# Patient Record
Sex: MALE | Race: ASIAN | NOT HISPANIC OR LATINO
[De-identification: names, ages, dates, MRNs, and addresses within clinical notes are randomized per-mention and may not be internally consistent; named-entity substitution may affect disease eponyms.]

---

## 2020-01-28 ENCOUNTER — APPOINTMENT (OUTPATIENT)
Dept: GASTROENTEROLOGY | Facility: CLINIC | Age: 45
End: 2020-01-28
Payer: COMMERCIAL

## 2020-01-28 VITALS
HEIGHT: 64 IN | OXYGEN SATURATION: 96 % | HEART RATE: 67 BPM | SYSTOLIC BLOOD PRESSURE: 120 MMHG | DIASTOLIC BLOOD PRESSURE: 80 MMHG | BODY MASS INDEX: 25.1 KG/M2 | WEIGHT: 147 LBS

## 2020-01-28 DIAGNOSIS — Z83.79 FAMILY HISTORY OF OTHER DISEASES OF THE DIGESTIVE SYSTEM: ICD-10-CM

## 2020-01-28 PROCEDURE — 99204 OFFICE O/P NEW MOD 45 MIN: CPT

## 2020-01-28 NOTE — PHYSICAL EXAM
[General Appearance - Alert] : alert [General Appearance - In No Acute Distress] : in no acute distress [Sclera] : the sclera and conjunctiva were normal [PERRL With Normal Accommodation] : pupils were equal in size, round, and reactive to light [Outer Ear] : the ears and nose were normal in appearance [Extraocular Movements] : extraocular movements were intact [Oropharynx] : the oropharynx was normal [Neck Appearance] : the appearance of the neck was normal [Thyroid Diffuse Enlargement] : the thyroid was not enlarged [Neck Cervical Mass (___cm)] : no neck mass was observed [Jugular Venous Distention Increased] : there was no jugular-venous distention [Thyroid Nodule] : there were no palpable thyroid nodules [Heart Rate And Rhythm] : heart rate was normal and rhythm regular [Auscultation Breath Sounds / Voice Sounds] : lungs were clear to auscultation bilaterally [Heart Sounds Gallop] : no gallops [Heart Sounds] : normal S1 and S2 [Heart Sounds Pericardial Friction Rub] : no pericardial rub [Murmurs] : no murmurs [Abdomen Soft] : soft [Bowel Sounds] : normal bowel sounds [Abdomen Tenderness] : non-tender [Abdomen Mass (___ Cm)] : no abdominal mass palpated [No CVA Tenderness] : no ~M costovertebral angle tenderness [No Spinal Tenderness] : no spinal tenderness [Abnormal Walk] : normal gait [Nail Clubbing] : no clubbing  or cyanosis of the fingernails [Motor Tone] : muscle strength and tone were normal [Musculoskeletal - Swelling] : no joint swelling seen [Skin Color & Pigmentation] : normal skin color and pigmentation [Skin Turgor] : normal skin turgor [] : no rash [Deep Tendon Reflexes (DTR)] : deep tendon reflexes were 2+ and symmetric [Sensation] : the sensory exam was normal to light touch and pinprick [No Focal Deficits] : no focal deficits [Affect] : the affect was normal [Impaired Insight] : insight and judgment were intact [Oriented To Time, Place, And Person] : oriented to person, place, and time

## 2020-01-28 NOTE — ASSESSMENT
[FreeTextEntry1] : GERD and abdominal bloating and pain. There appears to be two process going on .\par \par \par \par Plan: CBC, comp met, TTG, IgA, TSH, lipid panel, LBT, GI PCR, O and P, Calprotectin, EGD.

## 2020-01-28 NOTE — HISTORY OF PRESENT ILLNESS
[de-identified] : 44 year old male with GERD who is now on Omeprazole 40 mg BID. He notes that trigger foods include red meat, beer and spicy food. In 2018 he underwent an EGD and colonoscopy. The EGD showed esophagitis and an esophageal ulcer. The colonoscopy showed hemorrhoids. He sleeps on a wedge and stops eating early but he will get GERD symptoms if he eats after 8 pm. The symptoms he has with the GERD are both regurgitation and chest burning. These symptoms seem to be well controlled on PPI.\par \par He comes to me because of new symptoms including gas, bloating, abdominal pain and increase frequency in BM 93 to 4 formed BM per day). The pain is lower abdominal pain and is constant. When he gets the pain he has nausea.

## 2020-02-12 ENCOUNTER — RESULT REVIEW (OUTPATIENT)
Age: 45
End: 2020-02-12

## 2020-02-12 ENCOUNTER — APPOINTMENT (OUTPATIENT)
Dept: GASTROENTEROLOGY | Facility: HOSPITAL | Age: 45
End: 2020-02-12

## 2020-02-12 ENCOUNTER — OUTPATIENT (OUTPATIENT)
Dept: OUTPATIENT SERVICES | Facility: HOSPITAL | Age: 45
LOS: 1 days | Discharge: HOME | End: 2020-02-12
Payer: COMMERCIAL

## 2020-02-12 VITALS
RESPIRATION RATE: 17 BRPM | OXYGEN SATURATION: 100 % | SYSTOLIC BLOOD PRESSURE: 122 MMHG | DIASTOLIC BLOOD PRESSURE: 70 MMHG | HEART RATE: 72 BPM

## 2020-02-12 VITALS
HEART RATE: 63 BPM | HEIGHT: 64 IN | DIASTOLIC BLOOD PRESSURE: 79 MMHG | SYSTOLIC BLOOD PRESSURE: 127 MMHG | TEMPERATURE: 98 F | RESPIRATION RATE: 12 BRPM | OXYGEN SATURATION: 100 % | WEIGHT: 145.95 LBS

## 2020-02-12 DIAGNOSIS — R10.9 UNSPECIFIED ABDOMINAL PAIN: ICD-10-CM

## 2020-02-12 PROCEDURE — 88305 TISSUE EXAM BY PATHOLOGIST: CPT | Mod: 26

## 2020-02-12 PROCEDURE — 88312 SPECIAL STAINS GROUP 1: CPT | Mod: 26

## 2020-02-12 PROCEDURE — 43239 EGD BIOPSY SINGLE/MULTIPLE: CPT | Mod: GC

## 2020-02-12 RX ORDER — SODIUM CHLORIDE 9 MG/ML
1000 INJECTION, SOLUTION INTRAVENOUS
Refills: 0 | Status: DISCONTINUED | OUTPATIENT
Start: 2020-02-12 | End: 2020-03-04

## 2020-02-12 RX ADMIN — SODIUM CHLORIDE 75 MILLILITER(S): 9 INJECTION, SOLUTION INTRAVENOUS at 12:12

## 2020-02-18 LAB — SURGICAL PATHOLOGY STUDY: SIGNIFICANT CHANGE UP

## 2020-02-24 ENCOUNTER — APPOINTMENT (OUTPATIENT)
Dept: GASTROENTEROLOGY | Facility: CLINIC | Age: 45
End: 2020-02-24
Payer: COMMERCIAL

## 2020-02-24 PROCEDURE — 91065 BREATH HYDROGEN/METHANE TEST: CPT | Mod: 53

## 2020-02-26 LAB
CALPROTECTIN FECAL: 17 UG/G
DEPRECATED O AND P PREP STL: NORMAL
GI PCR PANEL, STOOL: NORMAL

## 2020-03-23 ENCOUNTER — APPOINTMENT (OUTPATIENT)
Dept: GASTROENTEROLOGY | Facility: CLINIC | Age: 45
End: 2020-03-23

## 2020-10-23 ENCOUNTER — TRANSCRIPTION ENCOUNTER (OUTPATIENT)
Age: 45
End: 2020-10-23

## 2021-03-03 NOTE — ASU PATIENT PROFILE, ADULT - PATIENT REPRESENTATIVE: ( YOU CAN CHOOSE ANY PERSON THAT CAN ASSIST YOU WITH YOUR HEALTH CARE PREFERENCES, DOES NOT HAVE TO BE A SPOUSE, IMMEDIATE FAMILY OR SIGNIFICANT OTHER/PARTNER)
Cont to see Ms. Flynn for assistance with Sofi Corona    Rules:  · Count every calorie every day  · Limit sweets to one day per month  · Limit restaurants (including fast food and food from a convenience store) to one time every two weeks    Requirements:  · Make sure protein intake is at least 70 grams per day (Consider using a protein shake - Nectar, Pure Protein, Premier, Boost Max, Ensure Max, BeneProtein and [employer] (which is lactose-free) are milk-based options; Nectar, Premier Protein Clear, IsoPure Protein Drink, and Protein 2 O are water-based options; Quest (or Cosco, which is cheaper and is ordered on [address] [facility] [address]) and the [employer] 1 protein bars can also be used, but have less protein in them ); other drink options can be found in the protein powder report on the [address] [place] [address] website  · Make sure that fiber intake is at least 22 grams per day. Do this by either eating 22 tablespoons of the original, plain Fiber One cereal every day or 4 tablespoons of Benefiber every day. Work up to this amount slowly by starting with only one-fourth of the target amount and adding another one-fourth every one or two weeks  · Take one multivitamin every day    Target:  · Limit calorie intake to 1700 calories/day  · Walk at least 2 miles each day  · Avoid eating 2 hours within bedtime.  (This is part of the 8 hours of food-free periods)    Tips:  · Do not eat outside of the dining room or the kitchen  · Do not eat while watching TV, videos, working on the computer or using a smart phone  · Do not eat food out of a multi-serving bag or container    Other:  Take LT4 100 mcg daily + T3 5 mcg, one-half tab three times each day  Recheck TSH in six weeks Yes

## 2021-05-30 ENCOUNTER — TRANSCRIPTION ENCOUNTER (OUTPATIENT)
Age: 46
End: 2021-05-30

## 2021-09-17 ENCOUNTER — RESULT REVIEW (OUTPATIENT)
Age: 46
End: 2021-09-17

## 2021-09-17 ENCOUNTER — APPOINTMENT (OUTPATIENT)
Dept: RHEUMATOLOGY | Facility: CLINIC | Age: 46
End: 2021-09-17
Payer: COMMERCIAL

## 2021-09-17 VITALS
RESPIRATION RATE: 16 BRPM | SYSTOLIC BLOOD PRESSURE: 118 MMHG | DIASTOLIC BLOOD PRESSURE: 82 MMHG | HEART RATE: 69 BPM | TEMPERATURE: 97.8 F | HEIGHT: 64 IN | WEIGHT: 153 LBS | BODY MASS INDEX: 26.12 KG/M2 | OXYGEN SATURATION: 98 %

## 2021-09-17 DIAGNOSIS — M25.50 PAIN IN UNSPECIFIED JOINT: ICD-10-CM

## 2021-09-17 PROCEDURE — 99203 OFFICE O/P NEW LOW 30 MIN: CPT

## 2021-09-20 LAB
ALBUMIN SERPL ELPH-MCNC: 4.8 G/DL
ALP BLD-CCNC: 45 U/L
ALT SERPL-CCNC: 26 U/L
ANION GAP SERPL CALC-SCNC: 13 MMOL/L
AST SERPL-CCNC: 24 U/L
BASOPHILS # BLD AUTO: 0.02 K/UL
BASOPHILS NFR BLD AUTO: 0.2 %
BILIRUB SERPL-MCNC: 0.4 MG/DL
BUN SERPL-MCNC: 20 MG/DL
C TRACH RRNA SPEC QL NAA+PROBE: NOT DETECTED
CALCIUM SERPL-MCNC: 10.1 MG/DL
CCP AB SER IA-ACNC: <8 UNITS
CHLORIDE SERPL-SCNC: 102 MMOL/L
CK SERPL-CCNC: 88 U/L
CO2 SERPL-SCNC: 25 MMOL/L
CREAT SERPL-MCNC: 0.95 MG/DL
CRP SERPL-MCNC: <3 MG/L
EOSINOPHIL # BLD AUTO: 0.06 K/UL
EOSINOPHIL NFR BLD AUTO: 0.7 %
ERYTHROCYTE [SEDIMENTATION RATE] IN BLOOD BY WESTERGREN METHOD: 3 MM/HR
GLUCOSE SERPL-MCNC: 90 MG/DL
HCT VFR BLD CALC: 40.8 %
HGB BLD-MCNC: 13.1 G/DL
IMM GRANULOCYTES NFR BLD AUTO: 0.2 %
LYMPHOCYTES # BLD AUTO: 2.53 K/UL
LYMPHOCYTES NFR BLD AUTO: 30.9 %
MAN DIFF?: NORMAL
MCHC RBC-ENTMCNC: 28.5 PG
MCHC RBC-ENTMCNC: 32.1 GM/DL
MCV RBC AUTO: 88.9 FL
MONOCYTES # BLD AUTO: 0.56 K/UL
MONOCYTES NFR BLD AUTO: 6.8 %
N GONORRHOEA RRNA SPEC QL NAA+PROBE: NOT DETECTED
NEUTROPHILS # BLD AUTO: 4.99 K/UL
NEUTROPHILS NFR BLD AUTO: 61.2 %
PLATELET # BLD AUTO: 273 K/UL
POTASSIUM SERPL-SCNC: 4.1 MMOL/L
PROT SERPL-MCNC: 6.8 G/DL
RBC # BLD: 4.59 M/UL
RBC # FLD: 12.5 %
RF+CCP IGG SER-IMP: NEGATIVE
RHEUMATOID FACT SER QL: <10 IU/ML
SODIUM SERPL-SCNC: 140 MMOL/L
SOURCE AMPLIFICATION: NORMAL
URATE SERPL-MCNC: 3.7 MG/DL
WBC # FLD AUTO: 8.18 K/UL

## 2021-09-24 LAB — HLA-B27 RELATED AG QL: NEGATIVE

## 2021-09-29 ENCOUNTER — TRANSCRIPTION ENCOUNTER (OUTPATIENT)
Age: 46
End: 2021-09-29

## 2021-12-04 ENCOUNTER — EMERGENCY (EMERGENCY)
Facility: HOSPITAL | Age: 46
LOS: 1 days | Discharge: HOME | End: 2021-12-04
Attending: STUDENT IN AN ORGANIZED HEALTH CARE EDUCATION/TRAINING PROGRAM | Admitting: STUDENT IN AN ORGANIZED HEALTH CARE EDUCATION/TRAINING PROGRAM
Payer: COMMERCIAL

## 2021-12-04 VITALS
SYSTOLIC BLOOD PRESSURE: 144 MMHG | HEART RATE: 67 BPM | DIASTOLIC BLOOD PRESSURE: 80 MMHG | TEMPERATURE: 97 F | OXYGEN SATURATION: 98 % | RESPIRATION RATE: 16 BRPM

## 2021-12-04 VITALS
SYSTOLIC BLOOD PRESSURE: 125 MMHG | WEIGHT: 143.08 LBS | HEART RATE: 68 BPM | HEIGHT: 64 IN | OXYGEN SATURATION: 96 % | RESPIRATION RATE: 18 BRPM | TEMPERATURE: 97 F | DIASTOLIC BLOOD PRESSURE: 78 MMHG

## 2021-12-04 LAB
ALBUMIN SERPL ELPH-MCNC: 4 G/DL — SIGNIFICANT CHANGE UP (ref 3.3–5)
ALP SERPL-CCNC: 44 U/L — SIGNIFICANT CHANGE UP (ref 40–120)
ALT FLD-CCNC: 38 U/L — SIGNIFICANT CHANGE UP (ref 12–78)
ANION GAP SERPL CALC-SCNC: 5 MMOL/L — SIGNIFICANT CHANGE UP (ref 5–17)
AST SERPL-CCNC: 21 U/L — SIGNIFICANT CHANGE UP (ref 15–37)
BASOPHILS # BLD AUTO: 0.03 K/UL — SIGNIFICANT CHANGE UP (ref 0–0.2)
BASOPHILS NFR BLD AUTO: 0.3 % — SIGNIFICANT CHANGE UP (ref 0–2)
BILIRUB SERPL-MCNC: 0.4 MG/DL — SIGNIFICANT CHANGE UP (ref 0.2–1.2)
BUN SERPL-MCNC: 20 MG/DL — SIGNIFICANT CHANGE UP (ref 7–23)
CALCIUM SERPL-MCNC: 9.4 MG/DL — SIGNIFICANT CHANGE UP (ref 8.5–10.1)
CHLORIDE SERPL-SCNC: 107 MMOL/L — SIGNIFICANT CHANGE UP (ref 96–108)
CO2 SERPL-SCNC: 28 MMOL/L — SIGNIFICANT CHANGE UP (ref 22–31)
CREAT SERPL-MCNC: 0.85 MG/DL — SIGNIFICANT CHANGE UP (ref 0.5–1.3)
EOSINOPHIL # BLD AUTO: 0.1 K/UL — SIGNIFICANT CHANGE UP (ref 0–0.5)
EOSINOPHIL NFR BLD AUTO: 0.9 % — SIGNIFICANT CHANGE UP (ref 0–6)
GLUCOSE SERPL-MCNC: 87 MG/DL — SIGNIFICANT CHANGE UP (ref 70–99)
HCT VFR BLD CALC: 40.4 % — SIGNIFICANT CHANGE UP (ref 39–50)
HGB BLD-MCNC: 13.7 G/DL — SIGNIFICANT CHANGE UP (ref 13–17)
IMM GRANULOCYTES NFR BLD AUTO: 0.4 % — SIGNIFICANT CHANGE UP (ref 0–1.5)
LYMPHOCYTES # BLD AUTO: 2.29 K/UL — SIGNIFICANT CHANGE UP (ref 1–3.3)
LYMPHOCYTES # BLD AUTO: 20.9 % — SIGNIFICANT CHANGE UP (ref 13–44)
MCHC RBC-ENTMCNC: 28.8 PG — SIGNIFICANT CHANGE UP (ref 27–34)
MCHC RBC-ENTMCNC: 33.9 GM/DL — SIGNIFICANT CHANGE UP (ref 32–36)
MCV RBC AUTO: 85.1 FL — SIGNIFICANT CHANGE UP (ref 80–100)
MONOCYTES # BLD AUTO: 0.65 K/UL — SIGNIFICANT CHANGE UP (ref 0–0.9)
MONOCYTES NFR BLD AUTO: 5.9 % — SIGNIFICANT CHANGE UP (ref 2–14)
NEUTROPHILS # BLD AUTO: 7.86 K/UL — HIGH (ref 1.8–7.4)
NEUTROPHILS NFR BLD AUTO: 71.6 % — SIGNIFICANT CHANGE UP (ref 43–77)
NRBC # BLD: 0 /100 WBCS — SIGNIFICANT CHANGE UP (ref 0–0)
PLATELET # BLD AUTO: 295 K/UL — SIGNIFICANT CHANGE UP (ref 150–400)
POTASSIUM SERPL-MCNC: 4 MMOL/L — SIGNIFICANT CHANGE UP (ref 3.5–5.3)
POTASSIUM SERPL-SCNC: 4 MMOL/L — SIGNIFICANT CHANGE UP (ref 3.5–5.3)
PROT SERPL-MCNC: 7.7 G/DL — SIGNIFICANT CHANGE UP (ref 6–8.3)
RBC # BLD: 4.75 M/UL — SIGNIFICANT CHANGE UP (ref 4.2–5.8)
RBC # FLD: 12.5 % — SIGNIFICANT CHANGE UP (ref 10.3–14.5)
SODIUM SERPL-SCNC: 140 MMOL/L — SIGNIFICANT CHANGE UP (ref 135–145)
TROPONIN I, HIGH SENSITIVITY RESULT: 19.4 NG/L — SIGNIFICANT CHANGE UP
WBC # BLD: 10.97 K/UL — HIGH (ref 3.8–10.5)
WBC # FLD AUTO: 10.97 K/UL — HIGH (ref 3.8–10.5)

## 2021-12-04 PROCEDURE — 71045 X-RAY EXAM CHEST 1 VIEW: CPT | Mod: 26

## 2021-12-04 PROCEDURE — 36415 COLL VENOUS BLD VENIPUNCTURE: CPT

## 2021-12-04 PROCEDURE — 93010 ELECTROCARDIOGRAM REPORT: CPT

## 2021-12-04 PROCEDURE — 73562 X-RAY EXAM OF KNEE 3: CPT

## 2021-12-04 PROCEDURE — 82962 GLUCOSE BLOOD TEST: CPT

## 2021-12-04 PROCEDURE — 85025 COMPLETE CBC W/AUTO DIFF WBC: CPT

## 2021-12-04 PROCEDURE — G1004: CPT

## 2021-12-04 PROCEDURE — 72125 CT NECK SPINE W/O DYE: CPT | Mod: 26,MG

## 2021-12-04 PROCEDURE — 93005 ELECTROCARDIOGRAM TRACING: CPT

## 2021-12-04 PROCEDURE — 73562 X-RAY EXAM OF KNEE 3: CPT | Mod: 26,RT

## 2021-12-04 PROCEDURE — 80053 COMPREHEN METABOLIC PANEL: CPT

## 2021-12-04 PROCEDURE — 70450 CT HEAD/BRAIN W/O DYE: CPT | Mod: MG

## 2021-12-04 PROCEDURE — 84484 ASSAY OF TROPONIN QUANT: CPT

## 2021-12-04 PROCEDURE — 71045 X-RAY EXAM CHEST 1 VIEW: CPT

## 2021-12-04 PROCEDURE — 70450 CT HEAD/BRAIN W/O DYE: CPT | Mod: 26,MG

## 2021-12-04 PROCEDURE — 99284 EMERGENCY DEPT VISIT MOD MDM: CPT

## 2021-12-04 PROCEDURE — 99284 EMERGENCY DEPT VISIT MOD MDM: CPT | Mod: 25

## 2021-12-04 PROCEDURE — 72125 CT NECK SPINE W/O DYE: CPT | Mod: MG

## 2021-12-04 RX ORDER — ACETAMINOPHEN 500 MG
650 TABLET ORAL ONCE
Refills: 0 | Status: COMPLETED | OUTPATIENT
Start: 2021-12-04 | End: 2021-12-04

## 2021-12-04 RX ORDER — SODIUM CHLORIDE 9 MG/ML
1000 INJECTION INTRAMUSCULAR; INTRAVENOUS; SUBCUTANEOUS ONCE
Refills: 0 | Status: COMPLETED | OUTPATIENT
Start: 2021-12-04 | End: 2021-12-04

## 2021-12-04 RX ADMIN — Medication 650 MILLIGRAM(S): at 19:54

## 2021-12-04 RX ADMIN — SODIUM CHLORIDE 1000 MILLILITER(S): 9 INJECTION INTRAMUSCULAR; INTRAVENOUS; SUBCUTANEOUS at 19:54

## 2021-12-04 NOTE — ED PROVIDER NOTE - OBJECTIVE STATEMENT
47 y/o M PMHx HLD, GERD c/o syncopal episode. Pt finished urinating, felt his heart racing, woke up in the bathtub. Called his wife 3:46PM prior to going to bathroom and again 3:52PM after he passed out. He suspects LOC for <1 minute. Was pushing to get the urine out prior to LOC. Denies prior syncopal episodes. Has had cardiology evaluations in the past but reports they have been negative. At this time pt with R sided HA, neck pain. Reports recently has had cough. Denies fever, CP, SOB. Recently had episode of lightheadedness when stood up in ED.    PMD: Yazmin Moreira  Cardio: >4 years ago, unsure of name

## 2021-12-04 NOTE — ED PROVIDER NOTE - PROGRESS NOTE DETAILS
Reevaluated patient at bedside.  Patient feeling well. Discussed the results of all diagnostic testing in ED. Vasovagal syncope. Stay hydrated. Avoid standing up too quickly. Avoid bearing down while urinating. Tylenol prn pain. F/u doctor. An opportunity to ask questions was provided.  Discussed the importance of prompt, close medical follow-up.  Patient will return with any changes, concerns or persistent/worsening symptoms.  Understanding of all instructions verbalized..

## 2021-12-04 NOTE — ED PROVIDER NOTE - PATIENT PORTAL LINK FT
You can access the FollowMyHealth Patient Portal offered by [facility] by registering at the following website: [email or web address]. By joining [facility]’s FollowMyHealth portal, you will also be able to view your health information using other applications (apps) compatible with our system.

## 2021-12-04 NOTE — ED PROVIDER NOTE - ATTENDING CONTRIBUTION TO CARE
46M history of HLD, GERD here s/p syncope. patient was holding his urine while watching a movie, then rushed to the bathroom and was pushing down to get his urine out and felt light headed and passed out.  On exam, patient well appearing, no acute distress, heart regular rate and rhythm, lungs clear to auscultation, CN 2-12 intact. likely vaso-vagal syncope. will hydrate, check labs, monitor and re-evaluate.

## 2021-12-04 NOTE — ED PROVIDER NOTE - CLINICAL SUMMARY MEDICAL DECISION MAKING FREE TEXT BOX
45 y/o M PMHx HLD, GERD c/o syncopal episode. - small swelling R forehead, no midline tenderness, R knee TTP, otherwise overall unremarkable exam - suspect vasovagal syncope - IVF, EKG, tylenol, labs, imaging, reassess

## 2021-12-21 PROBLEM — E78.5 HYPERLIPIDEMIA, UNSPECIFIED: Chronic | Status: ACTIVE | Noted: 2021-12-04

## 2021-12-21 PROBLEM — K21.9 GASTRO-ESOPHAGEAL REFLUX DISEASE WITHOUT ESOPHAGITIS: Chronic | Status: ACTIVE | Noted: 2021-12-04

## 2021-12-24 ENCOUNTER — OUTPATIENT (OUTPATIENT)
Dept: OUTPATIENT SERVICES | Facility: HOSPITAL | Age: 46
LOS: 1 days | End: 2021-12-24
Payer: COMMERCIAL

## 2021-12-24 ENCOUNTER — APPOINTMENT (OUTPATIENT)
Dept: RADIOLOGY | Facility: CLINIC | Age: 46
End: 2021-12-24
Payer: COMMERCIAL

## 2021-12-24 ENCOUNTER — APPOINTMENT (OUTPATIENT)
Dept: MRI IMAGING | Facility: CLINIC | Age: 46
End: 2021-12-24
Payer: COMMERCIAL

## 2021-12-24 DIAGNOSIS — M25.50 PAIN IN UNSPECIFIED JOINT: ICD-10-CM

## 2021-12-24 PROCEDURE — 70551 MRI BRAIN STEM W/O DYE: CPT | Mod: 26

## 2021-12-24 PROCEDURE — 72100 X-RAY EXAM L-S SPINE 2/3 VWS: CPT | Mod: 26

## 2021-12-24 PROCEDURE — 72202 X-RAY EXAM SI JOINTS 3/> VWS: CPT

## 2021-12-24 PROCEDURE — 72202 X-RAY EXAM SI JOINTS 3/> VWS: CPT | Mod: 26

## 2021-12-24 PROCEDURE — 72100 X-RAY EXAM L-S SPINE 2/3 VWS: CPT

## 2021-12-24 PROCEDURE — 70551 MRI BRAIN STEM W/O DYE: CPT

## 2022-02-02 ENCOUNTER — OUTPATIENT (OUTPATIENT)
Dept: OUTPATIENT SERVICES | Facility: HOSPITAL | Age: 47
LOS: 1 days | End: 2022-02-02
Payer: COMMERCIAL

## 2022-02-02 ENCOUNTER — APPOINTMENT (OUTPATIENT)
Dept: RADIOLOGY | Facility: CLINIC | Age: 47
End: 2022-02-02
Payer: COMMERCIAL

## 2022-02-02 DIAGNOSIS — Z00.00 ENCOUNTER FOR GENERAL ADULT MEDICAL EXAMINATION WITHOUT ABNORMAL FINDINGS: ICD-10-CM

## 2022-02-02 PROCEDURE — 72040 X-RAY EXAM NECK SPINE 2-3 VW: CPT

## 2022-02-02 PROCEDURE — 72040 X-RAY EXAM NECK SPINE 2-3 VW: CPT | Mod: 26

## 2022-05-26 ENCOUNTER — NON-APPOINTMENT (OUTPATIENT)
Age: 47
End: 2022-05-26

## 2022-05-26 ENCOUNTER — APPOINTMENT (OUTPATIENT)
Dept: ORTHOPEDIC SURGERY | Facility: CLINIC | Age: 47
End: 2022-05-26
Payer: COMMERCIAL

## 2022-05-26 VITALS — BODY MASS INDEX: 25.1 KG/M2 | WEIGHT: 147 LBS | HEIGHT: 64 IN

## 2022-05-26 PROCEDURE — 99213 OFFICE O/P EST LOW 20 MIN: CPT

## 2022-05-26 NOTE — PHYSICAL EXAM
[Right] : right knee [NL (0)] : extension 0 degrees [] : negative Valgus instability [TWNoteComboBox7] : flexion 135 degrees

## 2022-05-26 NOTE — DISCUSSION/SUMMARY
[de-identified] : 46m with right knee djd and loose body and locking symptoms. \par 1) new MRI right knee eval loose body\par 2) cryotherapy, rest and activity modification\par 3) rtc after MRI\par \par Entered by Nayana Mixon acting as scribe.\par

## 2022-05-26 NOTE — RETURN TO WORK/SCHOOL
[Return Date: _____] : as of [unfilled].  This has been discussed in detail with ~José Miguel~ and ~he/she~ understands this. [Other: ___] : [unfilled]

## 2022-05-26 NOTE — HISTORY OF PRESENT ILLNESS
[Gradual] : gradual [8] : 8 [2] : 2 [Radiating] : radiating [Sharp] : sharp [Constant] : constant [Rest] : rest [Meds] : meds [Stairs] : stairs [Lying in bed] : lying in bed [Full time] : Work status: full time [de-identified] : 5/26/22: Here for follow-up on the right knee. He reports an exacerbation of pain and swelling over the last few months. He started to commute more to work. He started doing PT where [facility] had 3 sessions. There is locking. Motrin PRN.\par \par 1/27/22: Here for fu R knee. He reports about 100% relief after MDP.\par 01/06/22: Here to f/up right knee. Reports that 5-6 days ago he started to experience an increase in right knee pain.\par States that right knee pain is similar to pain he had at last visit. He did well after aspiration in June and he feels that\par the swelling is starting again. He also reports that about one month ago he took a fall. \par 06/16/21: here to f/up right knee and review MRI results. \par 6/9/21: here to f/up right knee. Aspiration last visit, sent for labs - negative for lyme/crystals. Has seen some return of\par swelling over the knee. \par Mr. Antwan Ramesh, a 45-year-old male, presents today for right knee pain x 2 days. tightness in thigh and calf. also\par with fluid in the knee. went to  today and xr done. told he had fluid in the knee. also with pain down the side of the\par thigh. Tylenol with no relief. 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

## 2022-05-27 ENCOUNTER — FORM ENCOUNTER (OUTPATIENT)
Age: 47
End: 2022-05-27

## 2022-05-28 ENCOUNTER — APPOINTMENT (OUTPATIENT)
Dept: MRI IMAGING | Facility: CLINIC | Age: 47
End: 2022-05-28
Payer: COMMERCIAL

## 2022-05-28 PROCEDURE — 73721 MRI JNT OF LWR EXTRE W/O DYE: CPT | Mod: RT

## 2022-06-02 ENCOUNTER — APPOINTMENT (OUTPATIENT)
Dept: ORTHOPEDIC SURGERY | Facility: CLINIC | Age: 47
End: 2022-06-02
Payer: COMMERCIAL

## 2022-06-02 VITALS — HEIGHT: 64 IN | BODY MASS INDEX: 25.1 KG/M2 | WEIGHT: 147 LBS

## 2022-06-02 PROCEDURE — 73564 X-RAY EXAM KNEE 4 OR MORE: CPT | Mod: LT

## 2022-06-02 PROCEDURE — 99214 OFFICE O/P EST MOD 30 MIN: CPT

## 2022-06-02 NOTE — PHYSICAL EXAM
[Right] : right knee [NL (140)] : flexion 140 degrees [NL (0)] : extension 0 degrees [5___] : hamstring 5[unfilled]/5 [Left] : left knee [AP] : anteroposterior [Lateral] : lateral [[place]] : skyline [AP Standing] : anteroposterior standing [Loose body] : Loose body [Degenerative change] : Degenerative change [] : no extensor lag [TWNoteComboBox7] : flexion 135 degrees

## 2022-06-02 NOTE — HISTORY OF PRESENT ILLNESS
[3] : 3 [8] : 8 [Dull/Aching] : dull/aching [Localized] : localized [Sharp] : sharp [de-identified] : 06/02/22: Here to f/up right knee and to review MRI results. Reports continued right knee pain and stiffness. Also c/o of increasing left knee pain.\par 5/26/22: Here for follow-up on the right knee. He reports an exacerbation of pain and swelling over the last few months. He started to commute more to work. He started doing PT where hes had 3 sessions. There is locking. Motrin PRN.\par \par 1/27/22: Here for fu R knee. He reports about 100% relief after MDP.\par 01/06/22: Here to f/up right knee. Reports that 5-6 days ago he started to experience an increase in right knee pain.\par States that right knee pain is similar to pain he had at last visit. He did well after aspiration in June and he feels that\par the swelling is starting again. He also reports that about one month ago he took a fall. \par 06/16/21: here to f/up right knee and review MRI results. \par 6/9/21: here to f/up right knee. Aspiration last visit, sent for labs - negative for lyme/crystals. Has seen some return of\par swelling over the knee. \par Mr. Antwan Ramesh, a 45-year-old male, presents today for right knee pain x 2 days. tightness in thigh and calf. also\par with fluid in the knee. went to  today and xr done. told he had fluid in the knee. also with pain down the side of the\par thigh. Tylenol with no relief. [] : Post Surgical Visit: no [FreeTextEntry1] : R Knee [FreeTextEntry3] : N/A Chronic [FreeTextEntry5] : Pt is a 45 y/o male in for eval of the R Knee s/p MRI pt states no noticeable change in condition since last visit  [de-identified] : PT 2x wkly \par MRI

## 2022-06-02 NOTE — DATA REVIEWED
[MRI] : MRI [Right] : of the right [Knee] : knee [Report was reviewed and noted in the chart] : The report was reviewed and noted in the chart [I independently reviewed and interpreted images and report] : I independently reviewed and interpreted images and report [I reviewed the films/CD] : I reviewed the films/CD [FreeTextEntry1] : 1. Arthrosis most noted at medial joint with reactive marrow edema, joint effusion and 10 mm and 2 mm loose bodies posterior to the medial meniscus. Prominent spurring of the posterior medial tibia.\par 2. ACL mucoid change.\par 3. Healed MCL tear at the femur.

## 2022-06-02 NOTE — DISCUSSION/SUMMARY
[de-identified] : 46m with b/l knee djd and loose bodies\par 1) Discussed availability of visco injections and pt would like to proceed, will request auth for right knee.\par 2) cryotherapy, rest and activity modification\par 3) glucosamine and chondroitin\par 4) rtc with auth for injection\par \par Entered by Nayana Mixon acting as scribe.\par

## 2022-07-05 ENCOUNTER — APPOINTMENT (OUTPATIENT)
Dept: ORTHOPEDIC SURGERY | Facility: CLINIC | Age: 47
End: 2022-07-05

## 2022-07-05 VITALS — WEIGHT: 147 LBS | BODY MASS INDEX: 25.1 KG/M2 | HEIGHT: 64 IN

## 2022-07-05 PROCEDURE — 20611 DRAIN/INJ JOINT/BURSA W/US: CPT

## 2022-07-05 PROCEDURE — 99214 OFFICE O/P EST MOD 30 MIN: CPT | Mod: 25

## 2022-07-05 PROCEDURE — J3490M: CUSTOM

## 2022-07-05 NOTE — ASSESSMENT
[FreeTextEntry1] : due to acute flare up of OA and loose body - with acute effusion -- will try aspiration and injection today hold on HA injectiondue to severe pain and swelling

## 2022-07-05 NOTE — PROCEDURE
[Large Joint Injection] : Large joint injection [Right] : of the right [Knee] : knee [Pain] : pain [Inflammation] : inflammation [X-ray evidence of Osteoarthritis on this or prior visit] : x-ray evidence of Osteoarthritis on this or prior visit [Alcohol] : alcohol [Betadine] : betadine [___ cc    1%] : Lidocaine ~Vcc of 1%  [___ cc    0.25%] : Bupivacaine (Marcaine) ~Vcc of 0.25%  [___ cc    80mg] : Methylprednisolone (Depomedrol) ~Vcc of 80 mg  [Effusion] : effusion [de-identified] : 25cc clear yellow

## 2022-07-05 NOTE — IMAGING
[All Views] : anteroposterior, lateral, skyline, and anteroposterior standing [There are no fractures, subluxations or dislocations. No significant abnormalities are seen] : There are no fractures, subluxations or dislocations. No significant abnormalities are seen [Degenerative change] : Degenerative change [de-identified] : rt knee\par moderate effuiosn - \par guarding with ROM 0- 95\par medial tenderness - \par NVI \par 5/5 strenght

## 2022-07-05 NOTE — DISCUSSION/SUMMARY
[de-identified] : The natural progression of Osteoarthritis was explained to the patient.  We discussed the possible treatment options from conservative to operative.  These included NSAIDS, Glucosamine and Chondrotin sulfate, and Physical Therapy as well different types of injections.  We also discussed that at some point they may progress to needed a TKA.  Information and pamphlets were given when appropriate.\par

## 2022-07-05 NOTE — HISTORY OF PRESENT ILLNESS
[8] : 8 [7] : 7 [Dull/Aching] : dull/aching [Localized] : localized [Sharp] : sharp [Sleep] : sleep [Nothing helps with pain getting better] : Nothing helps with pain getting better [de-identified] : Pt known to Dr Hathaway for rt knee OA - with MRI showing medial wear and effusion -  no hx of inj in the past - pain  was steady but recently did a lot of walking recently sig more pain and swelling -  had aspiration last year that did help ---  [] : no [FreeTextEntry1] : right knee [FreeTextEntry5] : pt states that he sees Dr. Hathaway for his right knee and states that he developed arthritis on his right knee. pt states that he has an appointment with Dr. Hathaway for gel injections. He is here today because he recently went hiking and he does not have a lot of motion and believes he has fluid on his knee. [FreeTextEntry7] : down to his right foot  [de-identified] : motion [de-identified] : 6/2/2022 [de-identified] : Dr. Hathaway [de-identified] : MRI by Dr. Hathaway

## 2022-07-19 ENCOUNTER — APPOINTMENT (OUTPATIENT)
Dept: ORTHOPEDIC SURGERY | Facility: CLINIC | Age: 47
End: 2022-07-19

## 2022-07-19 VITALS — BODY MASS INDEX: 25.1 KG/M2 | HEIGHT: 64 IN | WEIGHT: 147 LBS

## 2022-07-19 PROCEDURE — 99213 OFFICE O/P EST LOW 20 MIN: CPT | Mod: 25

## 2022-07-19 PROCEDURE — 20611 DRAIN/INJ JOINT/BURSA W/US: CPT

## 2022-07-19 NOTE — HISTORY OF PRESENT ILLNESS
[1] : 1 [Euflexxa] : Euflexxa [3] : 3 [8] : 8 [Dull/Aching] : dull/aching [Localized] : localized [Sharp] : sharp [de-identified] : 06/02/22: Here to f/up right knee and to review MRI results. Reports continued right knee pain and stiffness. Also c/o of increasing left knee pain.\par 5/26/22: Here for follow-up on the right knee. He reports an exacerbation of pain and swelling over the last few months. He started to commute more to work. He started doing PT where hes had 3 sessions. There is locking. Motrin PRN.\par \par 1/27/22: Here for fu R knee. He reports about 100% relief after MDP.\par 01/06/22: Here to f/up right knee. Reports that 5-6 days ago he started to experience an increase in right knee pain.\par States that right knee pain is similar to pain he had at last visit. He did well after aspiration in June and he feels that\par the swelling is starting again. He also reports that about one month ago he took a fall. \par 06/16/21: here to f/up right knee and review MRI results. \par 6/9/21: here to f/up right knee. Aspiration last visit, sent for labs - negative for lyme/crystals. Has seen some return of\par swelling over the knee. \par Mr. Antwan Ramesh, a 45-year-old male, presents today for right knee pain x 2 days. tightness in thigh and calf. also\par with fluid in the knee. went to  today and xr done. told he had fluid in the knee. also with pain down the side of the\par thigh. Tylenol with no relief. [] : Post Surgical Visit: no [FreeTextEntry1] : R Knee [FreeTextEntry3] : N/A Chronic [FreeTextEntry5] : Pt is a 45 y/o male in for eval of the R Knee s/p MRI pt states no noticeable change in condition since last visit  [de-identified] : PT 2x wkly \par MRI

## 2022-07-19 NOTE — PHYSICAL EXAM
[Right] : right knee [NL (140)] : flexion 140 degrees [NL (0)] : extension 0 degrees [5___] : hamstring 5[unfilled]/5 [Left] : left knee [AP] : anteroposterior [Lateral] : lateral [[place]] : skyline [AP Standing] : anteroposterior standing [Loose body] : Loose body [Degenerative change] : Degenerative change [] : no extensor lag [TWNoteComboBox7] : flexion 135 degrees

## 2022-07-19 NOTE — DISCUSSION/SUMMARY
[de-identified] : 46m with b/l knee djd and loose bodies\par 1) euflexxa #1 right knee tolerated well\par 2) Cryotherapy\par 3) rto next week to continue series.

## 2022-07-19 NOTE — PROCEDURE
[FreeTextEntry3] : The risks, benefits and contents of the injection have been discussed.  Risks include but are not limited to allergic reaction, flare reaction, permanent white skin discoloration at the injection site and infection.  The patient understands the risks and agrees to having the injection.  All questions have been answered.\par \par Large joint injection was performed  of the right knee. The indication for this procedure was x-ray evidence of Osteoarthritis on this or prior visit. The site was prepped with alcohol and betadine. An injection of Lidocaine 3cc of 1% , Euflexxa, # 1 was used. \par \par Patient was advised to call if redness, pain or fever occur and apply ice for 15 minutes out of every hour for the next 12-24 hours as tolerated. \par \par Patient has tried OTC's including aspirin, Ibuprofen, Aleve, etc or prescription NSAIDS, and/or exercises at home and/or physical therapy without satisfactory response, patient had decreased mobility in the joint and the risks benefits, and alternatives have been discussed, and verbal consent was obtained. \par \par Ultrasound guidance was indicated for this patient due to prior failure or difficult injection.\par \par

## 2022-07-26 ENCOUNTER — APPOINTMENT (OUTPATIENT)
Dept: ORTHOPEDIC SURGERY | Facility: CLINIC | Age: 47
End: 2022-07-26

## 2022-07-26 VITALS — WEIGHT: 147 LBS | HEIGHT: 64 IN | BODY MASS INDEX: 25.1 KG/M2

## 2022-07-26 PROCEDURE — 20611 DRAIN/INJ JOINT/BURSA W/US: CPT | Mod: RT

## 2022-07-26 PROCEDURE — 99212 OFFICE O/P EST SF 10 MIN: CPT | Mod: 25

## 2022-07-26 NOTE — ASSESSMENT
[FreeTextEntry1] : 46m with b/l knee djd and loose bodies\par 1) euflexxa #2 right knee tolerated well\par 2) Cryotherapy\par 3) rto next week to continue series.

## 2022-07-26 NOTE — PHYSICAL EXAM
[Right] : right knee [Left] : left knee [NL (140)] : flexion 140 degrees [NL (0)] : extension 0 degrees [5___] : hamstring 5[unfilled]/5 [] : no extensor lag [TWNoteComboBox7] : flexion 135 degrees

## 2022-07-26 NOTE — HISTORY OF PRESENT ILLNESS
[Dull/Aching] : dull/aching [Localized] : localized [Sharp] : sharp [2] : 2 [Euflexxa] : Euflexxa [4] : 4 [de-identified] : 7/26/22: here for right knee euflexxa #2. no adverse reaction to previous inj. \par 06/02/22: Here to f/up right knee and to review MRI results. Reports continued right knee pain and stiffness. Also c/o of increasing left knee pain.\par 5/26/22: Here for follow-up on the right knee. He reports an exacerbation of pain and swelling over the last few months. He started to commute more to work. He started doing PT where [facility] had 3 sessions. There is locking. Motrin PRN.\par \par 1/27/22: Here for fu R knee. He reports about 100% relief after MDP.\par 01/06/22: Here to f/up right knee. Reports that 5-6 days ago he started to experience an increase in right knee pain.\par States that right knee pain is similar to pain he had at last visit. He did well after aspiration in June and he feels that\par the swelling is starting again. He also reports that about one month ago he took a fall. \par 06/16/21: here to f/up right knee and review MRI results. \par 6/9/21: here to f/up right knee. Aspiration last visit, sent for labs - negative for lyme/crystals. Has seen some return of\par swelling over the knee. \par Mr. Antwan Ramesh, a 45-year-old male, presents today for right knee pain x 2 days. tightness in thigh and calf. also\par with fluid in the knee. went to  today and xr done. told he had fluid in the knee. also with pain down the side of the\par thigh. Tylenol with no relief. [] : Post Surgical Visit: no [FreeTextEntry1] : R Knee [FreeTextEntry3] : N/A Chronic [FreeTextEntry5] :  SPARKLE SCHAFFER is a 46 year male who is here today for inj # 2.  [de-identified] : PT 2x wkly \par MRI  [de-identified] : 07/19/22

## 2022-07-26 NOTE — PROCEDURE
[FreeTextEntry3] : The risks, benefits and contents of the injection have been discussed.  Risks include but are not limited to allergic reaction, flare reaction, permanent white skin discoloration at the injection site and infection.  The patient understands the risks and agrees to having the injection.  All questions have been answered.\par \par Large joint injection was performed  of the right knee. The indication for this procedure was x-ray evidence of Osteoarthritis on this or prior visit. The site was prepped with alcohol and betadine. An injection of Lidocaine 3cc of 1% , Euflexxa, # 2 was used. \par \par Patient was advised to call if redness, pain or fever occur and apply ice for 15 minutes out of every hour for the next 12-24 hours as tolerated. \par \par Patient has tried OTC's including aspirin, Ibuprofen, Aleve, etc or prescription NSAIDS, and/or exercises at home and/or physical therapy without satisfactory response, patient had decreased mobility in the joint and the risks benefits, and alternatives have been discussed, and verbal consent was obtained. \par \par Ultrasound guidance was indicated for this patient due to prior failure or difficult injection.\par \par

## 2022-07-26 NOTE — DISCUSSION/SUMMARY
[de-identified] : 46m with b/l knee djd and loose bodies\par 1) euflexxa #1 right knee tolerated well\par 2) Cryotherapy\par 3) rto next week to continue series.

## 2022-08-02 ENCOUNTER — APPOINTMENT (OUTPATIENT)
Dept: ORTHOPEDIC SURGERY | Facility: CLINIC | Age: 47
End: 2022-08-02

## 2022-08-02 VITALS — BODY MASS INDEX: 25.1 KG/M2 | HEIGHT: 64 IN | WEIGHT: 147 LBS

## 2022-08-02 PROCEDURE — 99212 OFFICE O/P EST SF 10 MIN: CPT | Mod: 25

## 2022-08-02 PROCEDURE — 20611 DRAIN/INJ JOINT/BURSA W/US: CPT | Mod: RT

## 2022-08-02 NOTE — PROCEDURE
[FreeTextEntry3] : The risks, benefits and contents of the injection have been discussed.  Risks include but are not limited to allergic reaction, flare reaction, permanent white skin discoloration at the injection site and infection.  The patient understands the risks and agrees to having the injection.  All questions have been answered.\par \par Large joint injection was performed  of the right knee. The indication for this procedure was x-ray evidence of Osteoarthritis on this or prior visit. The site was prepped with alcohol and betadine. An injection of Lidocaine 3cc of 1% , Euflexxa, # 3 was used. \par \par Patient was advised to call if redness, pain or fever occur and apply ice for 15 minutes out of every hour for the next 12-24 hours as tolerated. \par \par Patient has tried OTC's including aspirin, Ibuprofen, Aleve, etc or prescription NSAIDS, and/or exercises at home and/or physical therapy without satisfactory response, patient had decreased mobility in the joint and the risks benefits, and alternatives have been discussed, and verbal consent was obtained. \par \par Ultrasound guidance was indicated for this patient due to prior failure or difficult injection.\par \par

## 2022-08-02 NOTE — DISCUSSION/SUMMARY
[de-identified] : 46m with b/l knee djd and loose bodies\par 1) euflexxa #3 right knee tolerated well\par 2) Cryotherapy\par 3) rto 6 weeks

## 2022-08-02 NOTE — HISTORY OF PRESENT ILLNESS
[7] : 7 [Dull/Aching] : dull/aching [Localized] : localized [Sharp] : sharp [Tightness] : tightness [Walking] : walking [Exercising] : exercising [Stairs] : stairs [3] : 3 [Euflexxa] : Euflexxa [de-identified] : 8/2/22: f/up R knee and euflexxa #3\par 7/26/22: here for right knee euflexxa #2. no adverse reaction to previous inj. \par 06/02/22: Here to f/up right knee and to review MRI results. Reports continued right knee pain and stiffness. Also c/o of increasing left knee pain.\par 5/26/22: Here for follow-up on the right knee. He reports an exacerbation of pain and swelling over the last few months. He started to commute more to work. He started doing PT where hes had 3 sessions. There is locking. Motrin PRN.\par \par 1/27/22: Here for fu R knee. He reports about 100% relief after MDP.\par 01/06/22: Here to f/up right knee. Reports that 5-6 days ago he started to experience an increase in right knee pain.\par States that right knee pain is similar to pain he had at last visit. He did well after aspiration in June and he feels that\par the swelling is starting again. He also reports that about one month ago he took a fall. \par 06/16/21: here to f/up right knee and review MRI results. \par 6/9/21: here to f/up right knee. Aspiration last visit, sent for labs - negative for lyme/crystals. Has seen some return of\par swelling over the knee. \par Mr. Antwan Ramesh, a 45-year-old male, presents today for right knee pain x 2 days. tightness in thigh and calf. also\par with fluid in the knee. went to  today and xr done. told he had fluid in the knee. also with pain down the side of the\par thigh. Tylenol with no relief. [] : Post Surgical Visit: no [FreeTextEntry1] : R Knee [FreeTextEntry3] : N/A Chronic [FreeTextEntry5] :  SPARKLE SCHAFFER is a 46 year male who is here today for inj # 2.  [de-identified] : MRI  [de-identified] : 07/26/22 [de-identified] : right knee

## 2022-08-03 ENCOUNTER — APPOINTMENT (OUTPATIENT)
Dept: ORTHOPEDIC SURGERY | Facility: CLINIC | Age: 47
End: 2022-08-03

## 2022-08-16 ENCOUNTER — APPOINTMENT (OUTPATIENT)
Dept: ORTHOPEDIC SURGERY | Facility: CLINIC | Age: 47
End: 2022-08-16

## 2022-08-16 VITALS — WEIGHT: 147 LBS | HEIGHT: 64 IN | BODY MASS INDEX: 25.1 KG/M2

## 2022-08-16 PROCEDURE — 73564 X-RAY EXAM KNEE 4 OR MORE: CPT | Mod: RT

## 2022-08-16 PROCEDURE — 99213 OFFICE O/P EST LOW 20 MIN: CPT

## 2022-08-16 NOTE — REVIEW OF SYSTEMS
[Joint Pain] : joint pain [Negative] : Heme/Lymph [Joint Stiffness] : joint stiffness [Joint Swelling] : joint swelling Improved. Stable.

## 2022-08-16 NOTE — HISTORY OF PRESENT ILLNESS
[9] : 9 [8] : 8 [de-identified] : 8/16/22: f/up Right knee. s/p completing series of euflexxa about 2 weeks ago.  Feels pain worsening over the medial and anterior aspect. . Having more locking/buckling sx. Worried due to trip coming up. - last csi 07.05.22.\par  Also c/o of diffuse numbness and pain throughout the RLE to the lower back.\par 8/2/22: f/up R knee and euflexxa #3\par 7/26/22: here for right knee euflexxa #2. no adverse reaction to previous inj. \par 06/02/22: Here to f/up right knee and to review MRI results. Reports continued right knee pain and stiffness. Also c/o of increasing left knee pain.\par 5/26/22: Here for follow-up on the right knee. He reports an exacerbation of pain and swelling over the last few months. He started to commute more to work. He started doing PT where [facility] had 3 sessions. There is locking. Motrin PRN.\par \par 1/27/22: Here for fu R knee. He reports about 100% relief after MDP.\par 01/06/22: Here to f/up right knee. Reports that 5-6 days ago he started to experience an increase in right knee pain.\par States that right knee pain is similar to pain he had at last visit. He did well after aspiration in June and he feels that\par the swelling is starting again. He also reports that about one month ago he took a fall. \par 06/16/21: here to f/up right knee and review MRI results. \par 6/9/21: here to f/up right knee. Aspiration last visit, sent for labs - negative for lyme/crystals. Has seen some return of\par swelling over the knee. \par Mr. Antwan Ramesh, a 45-year-old male, presents today for right knee pain x 2 days. tightness in thigh and calf. also\par with fluid in the knee. went to  today and xr done. told he had fluid in the knee. also with pain down the side of the\par thigh. Tylenol with no relief. [FreeTextEntry1] : rt knee  [FreeTextEntry5] :  SPARKLE SCHAFFER is a 46 year male who is here today for rt knee pain. He is doing worst since last visit. states he is in a lot of pain.  [de-identified] : Pain meds

## 2022-09-21 ENCOUNTER — APPOINTMENT (OUTPATIENT)
Dept: ORTHOPEDIC SURGERY | Facility: CLINIC | Age: 47
End: 2022-09-21

## 2022-09-23 ENCOUNTER — APPOINTMENT (OUTPATIENT)
Dept: ORTHOPEDIC SURGERY | Facility: CLINIC | Age: 47
End: 2022-09-23

## 2022-09-23 VITALS — WEIGHT: 147 LBS | BODY MASS INDEX: 25.1 KG/M2 | HEIGHT: 64 IN

## 2022-09-23 DIAGNOSIS — M23.41 LOOSE BODY IN KNEE, RIGHT KNEE: ICD-10-CM

## 2022-09-23 PROCEDURE — 20611 DRAIN/INJ JOINT/BURSA W/US: CPT | Mod: RT

## 2022-09-23 PROCEDURE — 99213 OFFICE O/P EST LOW 20 MIN: CPT | Mod: 25

## 2022-09-23 PROCEDURE — J3490M: CUSTOM

## 2022-09-23 NOTE — RETURN TO WORK/SCHOOL
[Return Date: _____] : as of [unfilled].  This has been discussed in detail with ~José Miguel~ and ~he/she~ understands this. [Other: ___] : [unfilled] [FreeTextEntry1] : Patient is to work from home on an as needed bases due to his knee condition.  If there are any questions, please do not hesitate to contact our office.

## 2022-09-23 NOTE — DISCUSSION/SUMMARY
[de-identified] : 46m with b/l knee djd and loose bodies; worsening and persistent right knee pain.  Also possible RLE radicular component to his pain.. tolerated aspiration of 19cc of straw colored fluid well\par  \par 1) discussed possible TKA for the right knee, would recommend f/up with Dr. Douglas\par 3) cryotherapy, rest and activity modification\par 4) Fu with berny Douglas prn\par \par Progress note completed by Medardo Lemon PA-C \par

## 2022-09-23 NOTE — PROCEDURE
[Right] : of the right [Knee] : knee [Pain] : pain [Alcohol] : alcohol [Betadine] : betadine [___ cc    1%] : Lidocaine ~Vcc of 1%  [___ cc    0.25%] : Bupivacaine (Marcaine) ~Vcc of 0.25%  [Effusion] : effusion [] : Patient tolerated procedure well [Call if redness, pain or fever occur] : call if redness, pain or fever occur [Apply ice for 15min out of every hour for the next 12-24 hours as tolerated] : apply ice for 15 minutes out of every hour for the next 12-24 hours as tolerated [Previous OTC use and PT nontherapeutic] : patient has tried OTC's including aspirin, Ibuprofen, Aleve, etc or prescription NSAIDS, and/or exercises at home and/or physical therapy without satisfactory response [Patient had decreased mobility in the joint] : patient had decreased mobility in the joint [Risks, benefits, alternatives discussed / Verbal consent obtained] : the risks benefits, and alternatives have been discussed, and verbal consent was obtained [de-identified] : 19cc [de-identified] : straw

## 2022-09-23 NOTE — HISTORY OF PRESENT ILLNESS
[6] : 6 [Dull/Aching] : dull/aching [Sharp] : sharp [Shooting] : shooting [Stabbing] : stabbing [Throbbing] : throbbing [Constant] : constant [Meds] : meds [Sitting] : sitting [Standing] : standing [Walking] : walking [de-identified] : 9/23/22: f/up R knee. MDP given at last visit gave some relief. Overdid it with exercise a few days ago. Having increased pain/ stiffness. Taking celebrex/ tylenol prn.\par 8/16/22: f/up Right knee. s/p completing series of euflexxa about 2 weeks ago.  Feels pain worsening over the medial and anterior aspect. . Having more locking/buckling sx. Worried due to trip coming up. - last csi 07.05.22.\par  Also c/o of diffuse numbness and pain throughout the RLE to the lower back.\par 8/2/22: f/up R knee and euflexxa #3\par 7/26/22: here for right knee euflexxa #2. no adverse reaction to previous inj. \par 06/02/22: Here to f/up right knee and to review MRI results. Reports continued right knee pain and stiffness. Also c/o of increasing left knee pain.\par 5/26/22: Here for follow-up on the right knee. He reports an exacerbation of pain and swelling over the last few months. He started to commute more to work. He started doing PT where [facility] had 3 sessions. There is locking. Motrin PRN.\par \par 1/27/22: Here for fu R knee. He reports about 100% relief after MDP.\par 01/06/22: Here to f/up right knee. Reports that 5-6 days ago he started to experience an increase in right knee pain.\par States that right knee pain is similar to pain he had at last visit. He did well after aspiration in June and he feels that\par the swelling is starting again. He also reports that about one month ago he took a fall. \par 06/16/21: here to f/up right knee and review MRI results. \par 6/9/21: here to f/up right knee. Aspiration last visit, sent for labs - negative for lyme/crystals. Has seen some return of\par swelling over the knee. \par Mr. Antwan Ramesh, a 45-year-old male, presents today for right knee pain x 2 days. tightness in thigh and calf. also\par with fluid in the knee. went to UC today and xr done. told he had fluid in the knee. also with pain down the side of the\par thigh. Tylenol with no relief. [] : no [FreeTextEntry1] : Right knee [FreeTextEntry5] : SPARKLE SCHAFFER is a 47 year old M here for a follow up for the right knee. Pt states that the pain has gotten worse in the past week- to the point the pt was not able to go into work for the past five days.  [FreeTextEntry7] : Radiating down the right leg into shin and up into the right thigh and hip area [FreeTextEntry9] : Brace [de-identified] : Activity

## 2022-11-01 ENCOUNTER — APPOINTMENT (OUTPATIENT)
Dept: ORTHOPEDIC SURGERY | Facility: CLINIC | Age: 47
End: 2022-11-01

## 2022-11-01 VITALS — WEIGHT: 147 LBS | BODY MASS INDEX: 25.1 KG/M2 | HEIGHT: 64 IN

## 2022-11-01 PROCEDURE — 99214 OFFICE O/P EST MOD 30 MIN: CPT

## 2022-11-01 NOTE — ASSESSMENT
[FreeTextEntry1] : due to acute flare up of OA and loose body - with acute effusion -- will try aspiration and injection today hold on HA injectiondue to severe pain and swelling. \par \par 11/1/22: Medial sided OA. Pain is affecting his ADL's as well as playing with his kids, walking and stairs. He has failed conservative treatment and is indicated for UKA. Discussed this with his wife present. He is unsure of when he would like to proceed but will speak to Mirian regarding dates. Risks and benefits discussed and all questions answered.

## 2022-11-01 NOTE — IMAGING
[de-identified] : right knee:\par moderate effusion\par guarding with ROM 0-115\par medial tenderness \par NVI \par 5/5

## 2022-12-20 ENCOUNTER — APPOINTMENT (OUTPATIENT)
Dept: GASTROENTEROLOGY | Facility: CLINIC | Age: 47
End: 2022-12-20

## 2022-12-20 ENCOUNTER — RX RENEWAL (OUTPATIENT)
Age: 47
End: 2022-12-20

## 2022-12-20 VITALS
HEIGHT: 64 IN | OXYGEN SATURATION: 98 % | HEART RATE: 70 BPM | BODY MASS INDEX: 25.44 KG/M2 | WEIGHT: 149 LBS | DIASTOLIC BLOOD PRESSURE: 90 MMHG | SYSTOLIC BLOOD PRESSURE: 130 MMHG

## 2022-12-20 DIAGNOSIS — Z80.0 FAMILY HISTORY OF MALIGNANT NEOPLASM OF DIGESTIVE ORGANS: ICD-10-CM

## 2022-12-20 DIAGNOSIS — K21.9 GASTRO-ESOPHAGEAL REFLUX DISEASE W/OUT ESOPHAGITIS: ICD-10-CM

## 2022-12-20 PROCEDURE — 99214 OFFICE O/P EST MOD 30 MIN: CPT

## 2022-12-20 NOTE — ASSESSMENT
[FreeTextEntry1] : Gas, bloating. History of colon polyp.\par \par Plan: LBT and get colonoscopy report from Dr Jean. Dameon for GERD break through. \par \par Note FH of colon cancer in pts father who has Crohn's disease.

## 2022-12-20 NOTE — HISTORY OF PRESENT ILLNESS
[FreeTextEntry1] : 47 year old male with a history of GERD which is treated well with Omeprazole. but does have occasional break through with dietary indiscretion.Pt complains of gas and bloating sometimes associated with lower abdominal burning abdominal pain.

## 2022-12-22 ENCOUNTER — APPOINTMENT (OUTPATIENT)
Dept: ORTHOPEDIC SURGERY | Facility: CLINIC | Age: 47
End: 2022-12-22

## 2022-12-22 VITALS — WEIGHT: 149 LBS | BODY MASS INDEX: 25.44 KG/M2 | HEIGHT: 64 IN

## 2022-12-22 PROCEDURE — 20611 DRAIN/INJ JOINT/BURSA W/US: CPT | Mod: RT

## 2022-12-22 PROCEDURE — 99213 OFFICE O/P EST LOW 20 MIN: CPT | Mod: 25

## 2022-12-22 NOTE — PROCEDURE
[Other: ____] : [unfilled] [Right] : of the right [Knee] : knee [Effusion] : effusion [de-identified] : 17cc [de-identified] : clear [FreeTextEntry3] : Large joint injection was performed of the right knee. An injection of Lidocaine 3cc of 1% , Ropivacaine 4cc of 0.5% , Triamcinolone (Kenalog) 2cc of 40 mg  was used. Patient was advised to call if redness, pain or fever occur and apply ice for 15 minutes out of every hour for the next 12-24 hours as tolerated. \par \par Patient has tried OTC's including aspirin, Ibuprofen, Aleve, etc or prescription NSAIDS, and/or exercises at home and/or physical therapy without satisfactory response, patient had decreased mobility in the joint and the risks benefits, and alternatives have been discussed, and verbal consent was obtained. \par The site was prepped with alcohol, betadine and ethyl chloride sprayed topically\par \par The risks, benefits and contents of the injection have been discussed.  Risks include but are not limited to allergic reaction, flare reaction, permanent white skin discoloration at the injection site and infection.  The patient understands the risks and agrees to having the injection.  All questions have been answered.\par \par Ultrasound guidance was indicated for this patient due to prior failure or difficult injection. All ultrasound images have been permanently captured and stored accordingly in our picture archiving and communication system.\par

## 2022-12-22 NOTE — DISCUSSION/SUMMARY
[de-identified] : 46m with right knee djd \par 1) csi right knee today - tolerated well - Discussed that if he does csi today he must wait 3 months prior to undergoing surgery\par 2) cryotherapy, rest and activity modification\par 3) hep\par \par Entered by Nayana Mixon acting as scribe.\par

## 2023-01-17 ENCOUNTER — APPOINTMENT (OUTPATIENT)
Dept: ORTHOPEDIC SURGERY | Facility: CLINIC | Age: 48
End: 2023-01-17

## 2023-02-07 ENCOUNTER — APPOINTMENT (OUTPATIENT)
Dept: ORTHOPEDIC SURGERY | Facility: CLINIC | Age: 48
End: 2023-02-07
Payer: COMMERCIAL

## 2023-02-07 VITALS — WEIGHT: 147 LBS | BODY MASS INDEX: 25.1 KG/M2 | HEIGHT: 64 IN

## 2023-02-07 PROCEDURE — 99213 OFFICE O/P EST LOW 20 MIN: CPT

## 2023-02-07 NOTE — DISCUSSION/SUMMARY
[de-identified] : The patient was advised of the diagnosis.  The natural history of the pathology was explained in full to the patient in layman's terms. All questions were answered.  The risks and benefits of surgical and non-surgical treatment alternatives were explained in full to the patient.\par

## 2023-02-07 NOTE — HISTORY OF PRESENT ILLNESS
[8] : 8 [5] : 5 [de-identified] : 11/1/22: Continues to have right knee pain as well as swelling. Also has shooting pains in the knee. No relief from HA injections, CSI and PT. Also having difficulty with commuting to work. Cannot play with his kids and pain inhibits his ADL. Worse with stairs and prolonged walking. \par \par Prev doc:\par Pt known to Dr Hathaway for rt knee OA - with MRI showing medial wear and effusion - no hx of inj in the past - pain was steady but recently did a lot of walking recently sig more pain and swelling - had aspiration last year that did help ---  [] : no [FreeTextEntry1] : right knee  [FreeTextEntry5] : SPARKLE SCHAFFER  is a 47 year male who is here today to follow up on right knee pain, states he has been having a lot pain recently, limited ROM.

## 2023-02-07 NOTE — ASSESSMENT
[FreeTextEntry1] : Previous doc:\par due to acute flare up of OA and loose body - with acute effusion -- will try aspiration and injection today hold on HA injectiondue to severe pain and swelling. \par 11/1/22: Medial sided OA. Pain is affecting his ADL's as well as playing with his kids, walking and stairs. He has failed conservative treatment and is indicated for UKA. Discussed this with his wife present. He is unsure of when he would like to proceed but will speak to Mirian regarding dates. Risks and benefits discussed and all questions answered.  \par \par 2/7/23: Cont pain and effusion - planning for UKA in april, needs CT.  Cannot aspirate due to timing of surgery.  Recent cortisone inj 6 weeks ago.  Will cont celebrex, ice, compression sleeve.

## 2023-02-07 NOTE — IMAGING
[de-identified] : right knee:\par moderate effusion\par guarding with ROM 0-115\par medial tenderness \par NVI \par 5/5

## 2023-03-08 ENCOUNTER — FORM ENCOUNTER (OUTPATIENT)
Age: 48
End: 2023-03-08

## 2023-03-09 ENCOUNTER — APPOINTMENT (OUTPATIENT)
Dept: CT IMAGING | Facility: CLINIC | Age: 48
End: 2023-03-09
Payer: COMMERCIAL

## 2023-03-09 PROCEDURE — 76376 3D RENDER W/INTRP POSTPROCES: CPT | Mod: RT

## 2023-03-09 PROCEDURE — 73700 CT LOWER EXTREMITY W/O DYE: CPT | Mod: RT

## 2023-03-20 ENCOUNTER — NON-APPOINTMENT (OUTPATIENT)
Age: 48
End: 2023-03-20

## 2023-03-23 ENCOUNTER — APPOINTMENT (OUTPATIENT)
Dept: GASTROENTEROLOGY | Facility: CLINIC | Age: 48
End: 2023-03-23

## 2023-03-23 ENCOUNTER — OUTPATIENT (OUTPATIENT)
Dept: OUTPATIENT SERVICES | Facility: HOSPITAL | Age: 48
LOS: 1 days | Discharge: HOME | End: 2023-03-23
Payer: COMMERCIAL

## 2023-03-23 VITALS
WEIGHT: 150.8 LBS | SYSTOLIC BLOOD PRESSURE: 150 MMHG | OXYGEN SATURATION: 98 % | RESPIRATION RATE: 17 BRPM | HEART RATE: 68 BPM | HEIGHT: 64 IN | DIASTOLIC BLOOD PRESSURE: 87 MMHG | TEMPERATURE: 97 F

## 2023-03-23 DIAGNOSIS — M17.11 UNILATERAL PRIMARY OSTEOARTHRITIS, RIGHT KNEE: ICD-10-CM

## 2023-03-23 DIAGNOSIS — Z01.818 ENCOUNTER FOR OTHER PREPROCEDURAL EXAMINATION: ICD-10-CM

## 2023-03-23 DIAGNOSIS — K21.9 GASTRO-ESOPHAGEAL REFLUX DISEASE WITHOUT ESOPHAGITIS: ICD-10-CM

## 2023-03-23 LAB
A1C WITH ESTIMATED AVERAGE GLUCOSE RESULT: 5.7 % — HIGH (ref 4–5.6)
ALBUMIN SERPL ELPH-MCNC: 4.2 G/DL — SIGNIFICANT CHANGE UP (ref 3.3–5)
ALP SERPL-CCNC: 39 U/L — LOW (ref 40–120)
ALT FLD-CCNC: 33 U/L — SIGNIFICANT CHANGE UP (ref 12–78)
ANION GAP SERPL CALC-SCNC: 5 MMOL/L — SIGNIFICANT CHANGE UP (ref 5–17)
AST SERPL-CCNC: 22 U/L — SIGNIFICANT CHANGE UP (ref 15–37)
BASOPHILS # BLD AUTO: 0.03 K/UL — SIGNIFICANT CHANGE UP (ref 0–0.2)
BASOPHILS NFR BLD AUTO: 0.4 % — SIGNIFICANT CHANGE UP (ref 0–2)
BILIRUB SERPL-MCNC: 0.5 MG/DL — SIGNIFICANT CHANGE UP (ref 0.2–1.2)
BUN SERPL-MCNC: 18 MG/DL — SIGNIFICANT CHANGE UP (ref 7–23)
CALCIUM SERPL-MCNC: 9.2 MG/DL — SIGNIFICANT CHANGE UP (ref 8.5–10.1)
CHLORIDE SERPL-SCNC: 108 MMOL/L — SIGNIFICANT CHANGE UP (ref 96–108)
CO2 SERPL-SCNC: 26 MMOL/L — SIGNIFICANT CHANGE UP (ref 22–31)
CREAT SERPL-MCNC: 0.89 MG/DL — SIGNIFICANT CHANGE UP (ref 0.5–1.3)
EGFR: 105 ML/MIN/1.73M2 — SIGNIFICANT CHANGE UP
EOSINOPHIL # BLD AUTO: 0.42 K/UL — SIGNIFICANT CHANGE UP (ref 0–0.5)
EOSINOPHIL NFR BLD AUTO: 6.1 % — HIGH (ref 0–6)
ESTIMATED AVERAGE GLUCOSE: 117 MG/DL — HIGH (ref 68–114)
GLUCOSE SERPL-MCNC: 91 MG/DL — SIGNIFICANT CHANGE UP (ref 70–99)
HCT VFR BLD CALC: 39.1 % — SIGNIFICANT CHANGE UP (ref 39–50)
HGB BLD-MCNC: 13 G/DL — SIGNIFICANT CHANGE UP (ref 13–17)
IMM GRANULOCYTES NFR BLD AUTO: 0.1 % — SIGNIFICANT CHANGE UP (ref 0–0.9)
INR BLD: 1.07 RATIO — SIGNIFICANT CHANGE UP (ref 0.88–1.16)
LYMPHOCYTES # BLD AUTO: 2.12 K/UL — SIGNIFICANT CHANGE UP (ref 1–3.3)
LYMPHOCYTES # BLD AUTO: 30.8 % — SIGNIFICANT CHANGE UP (ref 13–44)
MCHC RBC-ENTMCNC: 28.4 PG — SIGNIFICANT CHANGE UP (ref 27–34)
MCHC RBC-ENTMCNC: 33.2 G/DL — SIGNIFICANT CHANGE UP (ref 32–36)
MCV RBC AUTO: 85.4 FL — SIGNIFICANT CHANGE UP (ref 80–100)
MONOCYTES # BLD AUTO: 0.45 K/UL — SIGNIFICANT CHANGE UP (ref 0–0.9)
MONOCYTES NFR BLD AUTO: 6.5 % — SIGNIFICANT CHANGE UP (ref 2–14)
MRSA PCR RESULT.: SIGNIFICANT CHANGE UP
NEUTROPHILS # BLD AUTO: 3.85 K/UL — SIGNIFICANT CHANGE UP (ref 1.8–7.4)
NEUTROPHILS NFR BLD AUTO: 56.1 % — SIGNIFICANT CHANGE UP (ref 43–77)
NRBC # BLD: 0 /100 WBCS — SIGNIFICANT CHANGE UP (ref 0–0)
PLATELET # BLD AUTO: 278 K/UL — SIGNIFICANT CHANGE UP (ref 150–400)
POTASSIUM SERPL-MCNC: 4 MMOL/L — SIGNIFICANT CHANGE UP (ref 3.5–5.3)
POTASSIUM SERPL-SCNC: 4 MMOL/L — SIGNIFICANT CHANGE UP (ref 3.5–5.3)
PROT SERPL-MCNC: 7.5 GM/DL — SIGNIFICANT CHANGE UP (ref 6–8.3)
PROTHROM AB SERPL-ACNC: 12.9 SEC — SIGNIFICANT CHANGE UP (ref 10.5–13.4)
RBC # BLD: 4.58 M/UL — SIGNIFICANT CHANGE UP (ref 4.2–5.8)
RBC # FLD: 12.6 % — SIGNIFICANT CHANGE UP (ref 10.3–14.5)
S AUREUS DNA NOSE QL NAA+PROBE: DETECTED
SODIUM SERPL-SCNC: 139 MMOL/L — SIGNIFICANT CHANGE UP (ref 135–145)
VIT D25+D1,25 OH+D1,25 PNL SERPL-MCNC: 73.6 PG/ML — SIGNIFICANT CHANGE UP (ref 19.9–79.3)
WBC # BLD: 6.88 K/UL — SIGNIFICANT CHANGE UP (ref 3.8–10.5)
WBC # FLD AUTO: 6.88 K/UL — SIGNIFICANT CHANGE UP (ref 3.8–10.5)

## 2023-03-23 PROCEDURE — 93010 ELECTROCARDIOGRAM REPORT: CPT

## 2023-03-23 NOTE — OCCUPATIONAL THERAPY INITIAL EVALUATION ADULT - ADDITIONAL COMMENTS
At this time, pt is functioning in his  roles, self sufficient, driving & ambulating independently in the community without any assistive devices. Pt uses a soft left knee brace to prevent buckling Pt owns no DME . Pt c/o 7/10 pain in his right knee at rest and 10/10 at worse. The pain is exacerbated, by walking, prolonged standing, negotiating steps and is relieved with Tylenol and Celebrex. Pt is right hand dominant and wears glasses for reading. At this time, pt is functioning in his  roles, self sufficient, driving & ambulating independently in the community without any assistive devices. Pt uses a soft left knee brace to prevent buckling.  Pt owns no DME . Pt c/o 7/10 pain in his right knee at rest and 10/10 at worse. The pain is exacerbated, by walking, prolonged standing, negotiating steps and is relieved with Tylenol and Celebrex. Pt is right hand dominant and wears glasses for reading.

## 2023-03-23 NOTE — H&P PST ADULT - ASSESSMENT
right knee osteoarthritis   CAPRINI SCORE    AGE RELATED RISK FACTORS                                                       MOBILITY RELATED FACTORS  [x ] Age 41-60 years                                            (1 Point)                  [ ] Bed rest                                                        (1 Point)  [ ] Age: 61-74 years                                           (2 Points)                [ ] Plaster cast                                                   (2 Points)  [ ] Age= 75 years                                              (3 Points)                 [ ] Bed bound for more than 72 hours                   (2 Points)    DISEASE RELATED RISK FACTORS                                               GENDER SPECIFIC FACTORS  [ ] Edema in the lower extremities                       (1 Point)                  [ ] Pregnancy                                                     (1 Point)  [ ] Varicose veins                                               (1 Point)                  [ ] Post-partum < 6 weeks                                   (1 Point)             [x ] BMI > 25 Kg/m2                                            (1 Point)                  [ ] Hormonal therapy  or oral contraception            (1 Point)                 [ ] Sepsis (in the previous month)                        (1 Point)                  [ ] History of pregnancy complications  [ ] Pneumonia or serious lung disease                                               [ ] Unexplained or recurrent                       (1 Point)           (in the previous month)                               (1 Point)  [ ] Abnormal pulmonary function test                     (1 Point)                 SURGERY RELATED RISK FACTORS  [ ] Acute myocardial infarction                              (1 Point)                 [ ]  Section                                            (1 Point)  [ ] Congestive heart failure (in the previous month)  (1 Point)                 [ ] Minor surgery                                                 (1 Point)   [ ] Inflammatory bowel disease                             (1 Point)                 [ ] Arthroscopic surgery                                        (2 Points)  [ ] Central venous access                                    (2 Points)                [ ] General surgery lasting more than 45 minutes   (2 Points)       [ ] Stroke (in the previous month)                          (5 Points)               [x ] Elective arthroplasty                                        (5 Points)                                                                                                                                               HEMATOLOGY RELATED FACTORS                                                 TRAUMA RELATED RISK FACTORS  [ ] Prior episodes of VTE                                     (3 Points)                 [ ] Fracture of the hip, pelvis, or leg                       (5 Points)  [ ] Positive family history for VTE                         (3 Points)                 [ ] Acute spinal cord injury (in the previous month)  (5 Points)  [ ] Prothrombin 69055 A                                      (3 Points)                 [ ] Paralysis  (less than 1 month)                          (5 Points)  [ ] Factor V Leiden                                             (3 Points)                 [ ] Multiple Trauma within 1 month                         (5 Points)  [ ] Lupus anticoagulants                                     (3 Points)                                                           [ ] Anticardiolipin antibodies                                (3 Points)                                                       [ ] High homocysteine in the blood                      (3 Points)                                             [ ] Other congenital or acquired thrombophilia       (3 Points)                                                [ ] Heparin induced thrombocytopenia                  (3 Points)                                          Total Score [       7   ]  Caprini Score 0-2: Low risk, No VTE Prophylaxis required for most patient's, encourage ambulation  Caprini Score 3-6: At Risk, Pharmacologic VTE prophylaxis is indicated for most patients ( in the absence of a contraindication)  Caprini Score Greater than or = 7: High Risk , pharmacologic VTE is indicated for most patients ( in the absence of a contraindication)    Caprini score indicates that the patient is high risk for VTE event ( score 6 or greater). Surgical patient's in this group will benefit from both pharmacologic prophylaxis and intermittent compression devices . Surgical team will determine the balance between VTE  risk and bleeding risk and other clinical considerations

## 2023-03-23 NOTE — H&P PST ADULT - ATTENDING COMMENTS
Pt has advanced OA on X-ray.  They have failed all forms of conservative treatment including PT and Meds and Injections as necessary.  They are her for Joint Replacement.

## 2023-03-23 NOTE — OCCUPATIONAL THERAPY INITIAL EVALUATION ADULT - NSOTDISCHREC_GEN_A_CORE
postoperatively  STR to remediate deficit areas in order to achieve functional independent with ADL management and functional mobility/Home OT

## 2023-03-23 NOTE — OCCUPATIONAL THERAPY INITIAL EVALUATION ADULT - RANGE OF MOTION EXAMINATION, LOWER EXTREMITY
ROM is limited in left knee due to pain. Pt has approximately 10 degrees of extension lag in his left knee ./bilateral LE Active ROM was WFL  (within functional limits)/bilateral LE Passive ROM was WFL  (within functional limits)

## 2023-03-23 NOTE — H&P PST ADULT - HISTORY OF PRESENT ILLNESS
46 yo male c/o right knee pain 2/2 osteoarthis - scheduled for right knee arthroplasty  goal: to play soccer with the children   denies recent travels in the past 30 days. No fever, SOB, cough, flu like symptoms or body rash- covid screen  covid vaccine completed

## 2023-03-23 NOTE — OCCUPATIONAL THERAPY INITIAL EVALUATION ADULT - PERTINENT HX OF CURRENT PROBLEM, REHAB EVAL
Pt is a 46 y/o male slated for elective surgery for right TKR with MD Douglas on  4/5/23 due to OA, chronic pain and DJD. Pt reported locking in his right knee, but denied any  falls in the past 3-6 months. Pt has right genu varus and an antalgic gait.

## 2023-03-23 NOTE — OCCUPATIONAL THERAPY INITIAL EVALUATION ADULT - LIVES WITH, PROFILE
in a private house with 2 entry steps without handrails. Once inside, pt has to negotiate a flight of stairs with15 steps, right ascending handrail, to access the bedroom and bathroom. Pt plans to recuperate on the 1st floor, where there is a bedroom and bathroom  This bathroom has a tub/shower combination, fixed shower head and standard toilet with adequate space to fit a commode over it./parents/spouse

## 2023-03-23 NOTE — OCCUPATIONAL THERAPY INITIAL EVALUATION ADULT - SOCIAL CONCERNS
Pt voiced concerns about his recovery at home. Pt endorsed that his spouse will be able to assist him after he discharged home post-operatively./Complex psychosocial needs/coping issues

## 2023-03-24 ENCOUNTER — APPOINTMENT (OUTPATIENT)
Dept: GASTROENTEROLOGY | Facility: CLINIC | Age: 48
End: 2023-03-24
Payer: COMMERCIAL

## 2023-03-24 LAB — APTT BLD: 33.8 SEC — SIGNIFICANT CHANGE UP (ref 27.5–35.5)

## 2023-03-24 PROCEDURE — 91065 BREATH HYDROGEN/METHANE TEST: CPT | Mod: 59

## 2023-03-24 RX ORDER — MUPIROCIN 20 MG/G
1 OINTMENT TOPICAL
Qty: 10 | Refills: 0
Start: 2023-03-24 | End: 2023-03-28

## 2023-03-27 ENCOUNTER — NON-APPOINTMENT (OUTPATIENT)
Age: 48
End: 2023-03-27

## 2023-04-04 ENCOUNTER — TRANSCRIPTION ENCOUNTER (OUTPATIENT)
Age: 48
End: 2023-04-04

## 2023-04-04 RX ORDER — CELECOXIB 200 MG/1
200 CAPSULE ORAL EVERY 12 HOURS
Refills: 0 | Status: DISCONTINUED | OUTPATIENT
Start: 2023-04-06 | End: 2023-04-06

## 2023-04-04 RX ORDER — KETOROLAC TROMETHAMINE 30 MG/ML
30 SYRINGE (ML) INJECTION EVERY 8 HOURS
Refills: 0 | Status: DISCONTINUED | OUTPATIENT
Start: 2023-04-05 | End: 2023-04-06

## 2023-04-04 RX ORDER — ACETAMINOPHEN 500 MG
975 TABLET ORAL EVERY 8 HOURS
Refills: 0 | Status: DISCONTINUED | OUTPATIENT
Start: 2023-04-05 | End: 2023-04-06

## 2023-04-04 RX ORDER — POLYETHYLENE GLYCOL 3350 17 G/17G
17 POWDER, FOR SOLUTION ORAL AT BEDTIME
Refills: 0 | Status: DISCONTINUED | OUTPATIENT
Start: 2023-04-05 | End: 2023-04-06

## 2023-04-04 RX ORDER — HYDROMORPHONE HYDROCHLORIDE 2 MG/ML
0.5 INJECTION INTRAMUSCULAR; INTRAVENOUS; SUBCUTANEOUS ONCE
Refills: 0 | Status: DISCONTINUED | OUTPATIENT
Start: 2023-04-05 | End: 2023-04-06

## 2023-04-04 RX ORDER — ATORVASTATIN CALCIUM 80 MG/1
40 TABLET, FILM COATED ORAL AT BEDTIME
Refills: 0 | Status: DISCONTINUED | OUTPATIENT
Start: 2023-04-05 | End: 2023-04-06

## 2023-04-04 RX ORDER — OXYCODONE HYDROCHLORIDE 5 MG/1
5 TABLET ORAL EVERY 4 HOURS
Refills: 0 | Status: DISCONTINUED | OUTPATIENT
Start: 2023-04-05 | End: 2023-04-06

## 2023-04-04 RX ORDER — DEXAMETHASONE 0.5 MG/5ML
10 ELIXIR ORAL ONCE
Refills: 0 | Status: COMPLETED | OUTPATIENT
Start: 2023-04-06 | End: 2023-04-06

## 2023-04-04 RX ORDER — OXYCODONE HYDROCHLORIDE 5 MG/1
10 TABLET ORAL EVERY 4 HOURS
Refills: 0 | Status: DISCONTINUED | OUTPATIENT
Start: 2023-04-05 | End: 2023-04-06

## 2023-04-04 RX ORDER — FENOFIBRATE,MICRONIZED 130 MG
145 CAPSULE ORAL DAILY
Refills: 0 | Status: DISCONTINUED | OUTPATIENT
Start: 2023-04-05 | End: 2023-04-06

## 2023-04-04 RX ORDER — SODIUM CHLORIDE 9 MG/ML
1000 INJECTION INTRAMUSCULAR; INTRAVENOUS; SUBCUTANEOUS
Refills: 0 | Status: DISCONTINUED | OUTPATIENT
Start: 2023-04-05 | End: 2023-04-06

## 2023-04-04 RX ORDER — PANTOPRAZOLE SODIUM 20 MG/1
40 TABLET, DELAYED RELEASE ORAL
Refills: 0 | Status: DISCONTINUED | OUTPATIENT
Start: 2023-04-05 | End: 2023-04-06

## 2023-04-04 RX ORDER — SENNA PLUS 8.6 MG/1
2 TABLET ORAL AT BEDTIME
Refills: 0 | Status: DISCONTINUED | OUTPATIENT
Start: 2023-04-05 | End: 2023-04-06

## 2023-04-04 RX ORDER — ACETAMINOPHEN 500 MG
1000 TABLET ORAL ONCE
Refills: 0 | Status: COMPLETED | OUTPATIENT
Start: 2023-04-05 | End: 2023-04-05

## 2023-04-04 RX ORDER — ONDANSETRON 8 MG/1
4 TABLET, FILM COATED ORAL EVERY 6 HOURS
Refills: 0 | Status: DISCONTINUED | OUTPATIENT
Start: 2023-04-05 | End: 2023-04-06

## 2023-04-04 RX ORDER — MAGNESIUM HYDROXIDE 400 MG/1
30 TABLET, CHEWABLE ORAL DAILY
Refills: 0 | Status: DISCONTINUED | OUTPATIENT
Start: 2023-04-05 | End: 2023-04-06

## 2023-04-04 RX ORDER — ASPIRIN/CALCIUM CARB/MAGNESIUM 324 MG
81 TABLET ORAL
Refills: 0 | Status: DISCONTINUED | OUTPATIENT
Start: 2023-04-06 | End: 2023-04-06

## 2023-04-05 ENCOUNTER — INPATIENT (INPATIENT)
Facility: HOSPITAL | Age: 48
LOS: 0 days | Discharge: HOME HEALTH | End: 2023-04-06
Attending: ORTHOPAEDIC SURGERY | Admitting: ORTHOPAEDIC SURGERY
Payer: COMMERCIAL

## 2023-04-05 ENCOUNTER — APPOINTMENT (OUTPATIENT)
Dept: ORTHOPEDIC SURGERY | Facility: HOSPITAL | Age: 48
End: 2023-04-05
Payer: COMMERCIAL

## 2023-04-05 ENCOUNTER — TRANSCRIPTION ENCOUNTER (OUTPATIENT)
Age: 48
End: 2023-04-05

## 2023-04-05 VITALS
RESPIRATION RATE: 16 BRPM | SYSTOLIC BLOOD PRESSURE: 136 MMHG | OXYGEN SATURATION: 97 % | HEIGHT: 64 IN | TEMPERATURE: 98 F | DIASTOLIC BLOOD PRESSURE: 81 MMHG | HEART RATE: 72 BPM | WEIGHT: 147.05 LBS

## 2023-04-05 LAB
ANION GAP SERPL CALC-SCNC: 4 MMOL/L — LOW (ref 5–17)
BUN SERPL-MCNC: 15 MG/DL — SIGNIFICANT CHANGE UP (ref 7–23)
CALCIUM SERPL-MCNC: 9.1 MG/DL — SIGNIFICANT CHANGE UP (ref 8.5–10.1)
CHLORIDE SERPL-SCNC: 113 MMOL/L — HIGH (ref 96–108)
CO2 SERPL-SCNC: 24 MMOL/L — SIGNIFICANT CHANGE UP (ref 22–31)
CREAT SERPL-MCNC: 0.97 MG/DL — SIGNIFICANT CHANGE UP (ref 0.5–1.3)
EGFR: 96 ML/MIN/1.73M2 — SIGNIFICANT CHANGE UP
GLUCOSE SERPL-MCNC: 105 MG/DL — HIGH (ref 70–99)
HCT VFR BLD CALC: 34.1 % — LOW (ref 39–50)
HGB BLD-MCNC: 11.2 G/DL — LOW (ref 13–17)
MCHC RBC-ENTMCNC: 28.8 PG — SIGNIFICANT CHANGE UP (ref 27–34)
MCHC RBC-ENTMCNC: 32.8 G/DL — SIGNIFICANT CHANGE UP (ref 32–36)
MCV RBC AUTO: 87.7 FL — SIGNIFICANT CHANGE UP (ref 80–100)
NRBC # BLD: 0 /100 WBCS — SIGNIFICANT CHANGE UP (ref 0–0)
PLATELET # BLD AUTO: 271 K/UL — SIGNIFICANT CHANGE UP (ref 150–400)
POTASSIUM SERPL-MCNC: 4.1 MMOL/L — SIGNIFICANT CHANGE UP (ref 3.5–5.3)
POTASSIUM SERPL-SCNC: 4.1 MMOL/L — SIGNIFICANT CHANGE UP (ref 3.5–5.3)
RBC # BLD: 3.89 M/UL — LOW (ref 4.2–5.8)
RBC # FLD: 12.8 % — SIGNIFICANT CHANGE UP (ref 10.3–14.5)
SODIUM SERPL-SCNC: 141 MMOL/L — SIGNIFICANT CHANGE UP (ref 135–145)
WBC # BLD: 8.01 K/UL — SIGNIFICANT CHANGE UP (ref 3.8–10.5)
WBC # FLD AUTO: 8.01 K/UL — SIGNIFICANT CHANGE UP (ref 3.8–10.5)

## 2023-04-05 PROCEDURE — 20610 DRAIN/INJ JOINT/BURSA W/O US: CPT | Mod: 59,RT

## 2023-04-05 PROCEDURE — 73560 X-RAY EXAM OF KNEE 1 OR 2: CPT | Mod: 26,RT

## 2023-04-05 PROCEDURE — 20985 CPTR-ASST DIR MS PX: CPT

## 2023-04-05 PROCEDURE — 27446 REVISION OF KNEE JOINT: CPT | Mod: RT

## 2023-04-05 PROCEDURE — 27446 REVISION OF KNEE JOINT: CPT | Mod: AS,RT

## 2023-04-05 DEVICE — CEMENT SIMPLEX P 40GM: Type: IMPLANTABLE DEVICE | Site: RIGHT | Status: FUNCTIONAL

## 2023-04-05 DEVICE — INSERT TIB ONLAY RESTORIS MAKO UKR X3 38MM: Type: IMPLANTABLE DEVICE | Site: RIGHT | Status: FUNCTIONAL

## 2023-04-05 DEVICE — COMP FEM RESTORIS MCK RM-LL SZ 3: Type: IMPLANTABLE DEVICE | Site: RIGHT | Status: FUNCTIONAL

## 2023-04-05 DEVICE — MAKO BONE PIN 3.2MM X 110MM: Type: IMPLANTABLE DEVICE | Site: RIGHT | Status: FUNCTIONAL

## 2023-04-05 DEVICE — MAKO BONE PIN 3.2MM X 140MM: Type: IMPLANTABLE DEVICE | Site: RIGHT | Status: FUNCTIONAL

## 2023-04-05 DEVICE — IMPLANTABLE DEVICE: Type: IMPLANTABLE DEVICE | Site: RIGHT | Status: FUNCTIONAL

## 2023-04-05 RX ORDER — SODIUM CHLORIDE 9 MG/ML
1000 INJECTION, SOLUTION INTRAVENOUS
Refills: 0 | Status: DISCONTINUED | OUTPATIENT
Start: 2023-04-05 | End: 2023-04-05

## 2023-04-05 RX ORDER — ONDANSETRON 8 MG/1
1 TABLET, FILM COATED ORAL
Qty: 28 | Refills: 0
Start: 2023-04-05 | End: 2023-04-11

## 2023-04-05 RX ORDER — VANCOMYCIN HCL 1 G
1000 VIAL (EA) INTRAVENOUS ONCE
Refills: 0 | Status: DISCONTINUED | OUTPATIENT
Start: 2023-04-05 | End: 2023-04-05

## 2023-04-05 RX ORDER — CEFAZOLIN SODIUM 1 G
2000 VIAL (EA) INJECTION EVERY 8 HOURS
Refills: 0 | Status: COMPLETED | OUTPATIENT
Start: 2023-04-05 | End: 2023-04-06

## 2023-04-05 RX ORDER — ONDANSETRON 8 MG/1
4 TABLET, FILM COATED ORAL ONCE
Refills: 0 | Status: DISCONTINUED | OUTPATIENT
Start: 2023-04-05 | End: 2023-04-05

## 2023-04-05 RX ORDER — ASPIRIN 81 MG
81 TABLET, DELAYED RELEASE (ENTERIC COATED) ORAL
Qty: 60 | Refills: 0 | Status: ACTIVE | COMMUNITY
Start: 2023-04-05 | End: 1900-01-01

## 2023-04-05 RX ORDER — HYDROMORPHONE HYDROCHLORIDE 2 MG/ML
0.5 INJECTION INTRAMUSCULAR; INTRAVENOUS; SUBCUTANEOUS
Refills: 0 | Status: DISCONTINUED | OUTPATIENT
Start: 2023-04-05 | End: 2023-04-05

## 2023-04-05 RX ORDER — SODIUM CHLORIDE 9 MG/ML
3 INJECTION INTRAMUSCULAR; INTRAVENOUS; SUBCUTANEOUS EVERY 8 HOURS
Refills: 0 | Status: DISCONTINUED | OUTPATIENT
Start: 2023-04-05 | End: 2023-04-05

## 2023-04-05 RX ORDER — ACETAMINOPHEN 500 MG
650 TABLET ORAL ONCE
Refills: 0 | Status: COMPLETED | OUTPATIENT
Start: 2023-04-05 | End: 2023-04-05

## 2023-04-05 RX ORDER — SODIUM CHLORIDE 9 MG/ML
500 INJECTION INTRAMUSCULAR; INTRAVENOUS; SUBCUTANEOUS ONCE
Refills: 0 | Status: COMPLETED | OUTPATIENT
Start: 2023-04-05 | End: 2023-04-05

## 2023-04-05 RX ORDER — CELECOXIB 200 MG/1
200 CAPSULE ORAL ONCE
Refills: 0 | Status: COMPLETED | OUTPATIENT
Start: 2023-04-05 | End: 2023-04-05

## 2023-04-05 RX ORDER — ACETAMINOPHEN 500 MG
1000 TABLET ORAL ONCE
Refills: 0 | Status: DISCONTINUED | OUTPATIENT
Start: 2023-04-05 | End: 2023-04-06

## 2023-04-05 RX ORDER — PANTOPRAZOLE SODIUM 20 MG/1
40 TABLET, DELAYED RELEASE ORAL
Refills: 0 | Status: DISCONTINUED | OUTPATIENT
Start: 2023-04-05 | End: 2023-04-05

## 2023-04-05 RX ORDER — OXYCODONE HYDROCHLORIDE 5 MG/1
1 TABLET ORAL
Qty: 42 | Refills: 0
Start: 2023-04-05 | End: 2023-04-11

## 2023-04-05 RX ADMIN — SENNA PLUS 2 TABLET(S): 8.6 TABLET ORAL at 22:36

## 2023-04-05 RX ADMIN — SODIUM CHLORIDE 500 MILLILITER(S): 9 INJECTION INTRAMUSCULAR; INTRAVENOUS; SUBCUTANEOUS at 12:03

## 2023-04-05 RX ADMIN — Medication 975 MILLIGRAM(S): at 22:36

## 2023-04-05 RX ADMIN — OXYCODONE HYDROCHLORIDE 5 MILLIGRAM(S): 5 TABLET ORAL at 23:37

## 2023-04-05 RX ADMIN — OXYCODONE HYDROCHLORIDE 10 MILLIGRAM(S): 5 TABLET ORAL at 15:29

## 2023-04-05 RX ADMIN — OXYCODONE HYDROCHLORIDE 5 MILLIGRAM(S): 5 TABLET ORAL at 22:37

## 2023-04-05 RX ADMIN — Medication 975 MILLIGRAM(S): at 23:36

## 2023-04-05 RX ADMIN — CELECOXIB 200 MILLIGRAM(S): 200 CAPSULE ORAL at 07:51

## 2023-04-05 RX ADMIN — POLYETHYLENE GLYCOL 3350 17 GRAM(S): 17 POWDER, FOR SOLUTION ORAL at 22:37

## 2023-04-05 RX ADMIN — Medication 650 MILLIGRAM(S): at 07:50

## 2023-04-05 RX ADMIN — Medication 30 MILLIGRAM(S): at 19:25

## 2023-04-05 RX ADMIN — OXYCODONE HYDROCHLORIDE 5 MILLIGRAM(S): 5 TABLET ORAL at 17:47

## 2023-04-05 RX ADMIN — Medication 30 MILLIGRAM(S): at 19:09

## 2023-04-05 RX ADMIN — SODIUM CHLORIDE 125 MILLILITER(S): 9 INJECTION INTRAMUSCULAR; INTRAVENOUS; SUBCUTANEOUS at 14:07

## 2023-04-05 RX ADMIN — Medication 1000 MILLIGRAM(S): at 14:30

## 2023-04-05 RX ADMIN — PANTOPRAZOLE SODIUM 40 MILLIGRAM(S): 20 TABLET, DELAYED RELEASE ORAL at 14:06

## 2023-04-05 RX ADMIN — OXYCODONE HYDROCHLORIDE 5 MILLIGRAM(S): 5 TABLET ORAL at 18:47

## 2023-04-05 RX ADMIN — Medication 400 MILLIGRAM(S): at 14:03

## 2023-04-05 RX ADMIN — Medication 100 MILLIGRAM(S): at 17:46

## 2023-04-05 RX ADMIN — Medication 145 MILLIGRAM(S): at 22:36

## 2023-04-05 RX ADMIN — SODIUM CHLORIDE 75 MILLILITER(S): 9 INJECTION, SOLUTION INTRAVENOUS at 12:03

## 2023-04-05 NOTE — DISCHARGE NOTE NURSING/CASE MANAGEMENT/SOCIAL WORK - NSDCFUADDAPPT_GEN_ALL_CORE_FT
Follow up with your surgeon in two weeks. Call for appointment.  If you need more pain medication, call your surgeon's office. For medication refills or authorizations, please call [phone]  We recommend that you call and schedule a follow up appointment within 2-4 weeks with your primary care physician for repeat blood work (CBC and BMP) for post hospital discharge follow-up care.  Call your surgeon if you have increased redness/pain/drainage or fever. Return to ER for shortness of breath/calf tenderness.

## 2023-04-05 NOTE — DISCHARGE NOTE PROVIDER - HOSPITAL COURSE
47yMale with history of Right knee OA presenting for R unicompartment replacement by Dr. Douglas on 4/5/23. Risk and benefits of surgery were explained to the patient. The patient understood and agreed to proceed with surgery. Patient underwent the procedure with no intraoperative complications. Pt was brought in stable condition to the PACU. Once stable in PACU, pt was brought to the floor. During hospital stay pt was followed by Medicine, physical therapy, Home Care during this admission. Pt is stable for discharge 47yMale with history of Right knee OA presenting for R unicompartment replacement by Dr. Douglas on 4/5/23. Risk and benefits of surgery were explained to the patient. The patient understood and agreed to proceed with surgery. Patient underwent the procedure with no intraoperative complications. Pt was brought in stable condition to the PACU. Once stable in PACU, pt was brought to the floor. During hospital stay pt was followed by Medicine, physical therapy, Home Care during this admission. Pt is stable for discharge Home.

## 2023-04-05 NOTE — CONSULT NOTE ADULT - SUBJECTIVE AND OBJECTIVE BOX
SPARKLE SCHAFFER is a 47y Male s/p ROBOTIC ASSISTED RIGHT UNILATERAL KNEE ARTHROPLASTY WITH CHARY      w/ h/o No pertinent past medical history    HLD (hyperlipidemia)    GERD (gastroesophageal reflux disease)      denies any chest pain shortness of breath palpitation dizziness lightheadedness nausea vomiting fever or chills    No significant past surgical history        SH: doesnot smoke or drink at this time    penicillin (Short breath)    acetaminophen     Tablet .. 975 milliGRAM(s) Oral every 8 hours  atorvastatin 40 milliGRAM(s) Oral at bedtime  ceFAZolin   IVPB 2000 milliGRAM(s) IV Intermittent every 8 hours  fenofibrate Tablet 145 milliGRAM(s) Oral daily  HYDROmorphone  Injectable 0.5 milliGRAM(s) IV Push once  ketorolac   Injectable 30 milliGRAM(s) IV Push every 8 hours  magnesium hydroxide Suspension 30 milliLiter(s) Oral daily PRN  multivitamin 1 Tablet(s) Oral daily  ondansetron Injectable 4 milliGRAM(s) IV Push every 6 hours PRN  oxyCODONE    IR 5 milliGRAM(s) Oral every 4 hours PRN  oxyCODONE    IR 10 milliGRAM(s) Oral every 4 hours PRN  oxyCODONE    IR 5 milliGRAM(s) Oral every 4 hours  pantoprazole    Tablet 40 milliGRAM(s) Oral before breakfast  polyethylene glycol 3350 17 Gram(s) Oral at bedtime  senna 2 Tablet(s) Oral at bedtime  sodium chloride 0.9%. 1000 milliLiter(s) IV Continuous <Continuous>    T(C): 36.7 (04-05-23 @ 17:00), Max: 36.9 (04-05-23 @ 15:30)  HR: 100 (04-05-23 @ 18:43) (72 - 106)  BP: 130/78 (04-05-23 @ 18:43) (107/71 - 150/93)  RR: 17 (04-05-23 @ 16:30) (12 - 20)  SpO2: 96% (04-05-23 @ 18:43) (94% - 98%)  HEENT unremarkable  neck no JVD or bruit  heart normal S1 S2 RRR no gallops or rubs  chest clear to auscultation  abd sof nontender non distended +bs  ext no calf tenderness    A/P   DVT PX  pain control  bowel regimen   wound care as per ortho  GI PX  antiemetics prn  incentive spirometer

## 2023-04-05 NOTE — PATIENT PROFILE ADULT - FALL HARM RISK - HARM RISK INTERVENTIONS
Assistance with ambulation/Assistance OOB with selected safe patient handling equipment/Communicate Risk of Fall with Harm to all staff/Discuss with provider need for PT consult/Monitor gait and stability/Provide patient with walking aids - walker, cane, crutches/Reinforce activity limits and safety measures with patient and family/Sit up slowly, dangle for a short time, stand at bedside before walking/Tailored Fall Risk Interventions/Use of alarms - bed, chair and/or voice tab/Visual Cue: Yellow wristband and red socks/Bed in lowest position, wheels locked, appropriate side rails in place/Call bell, personal items and telephone in reach/Instruct patient to call for assistance before getting out of bed or chair/Non-slip footwear when patient is out of bed/[place] to call system/Physically safe environment - no spills, clutter or unnecessary equipment/Purposeful Proactive Rounding/Room/bathroom lighting operational, light cord in reach

## 2023-04-05 NOTE — DISCHARGE NOTE PROVIDER - NSDCCPCAREPLAN_GEN_ALL_CORE_FT
PRINCIPAL DISCHARGE DIAGNOSIS  Diagnosis: Osteoarthritis of knee  Assessment and Plan of Treatment:

## 2023-04-05 NOTE — DISCHARGE NOTE PROVIDER - CARE PROVIDER_API CALL
Gregorio Douglas)  Orthopaedic Surgery  [address] [place] [address]  Phone: [phone]  Fax: [phone]  Follow Up Time:

## 2023-04-05 NOTE — DISCHARGE NOTE NURSING/CASE MANAGEMENT/SOCIAL WORK - NSDCPEFALRISK_GEN_ALL_CORE
For information on Fall & Injury Prevention, visit: [email or web address] OR  [email or web address] OR  https://www.cdc.gov/steadi/patient.html

## 2023-04-05 NOTE — PHYSICAL THERAPY INITIAL EVALUATION ADULT - GENERAL OBSERVATIONS, REHAB EVAL
Pt recd supine NAD c wife @ bedside, who is a pediatrician Pt identified as possible SDD. Pt recd supine NAD c wife @ bedside, who is a pediatrician. Question sof pt/wife answered. Greenstrap + TKR packet given, Reviewed HEP c return demo  of correct form + technique. Pt c/o numbness of R goo, headache, mild lightheadedness, & R knee pain that remained  7/10 for the bed mob/xfers & ex and inc to 9/10 post in room ambulation.  Pt unable to ambulate outside  room or do stairs at [facility] session 2* worsening of  pain & lightheadedness.  Case d/w RN Francois + JOO Leon . Pt requested to try after few  hours when headache & knee pain decline.

## 2023-04-05 NOTE — ASU PATIENT PROFILE, ADULT - HISTORY OF COVID-19 VACCINATION
-continue BB, Hydralazine, Digoxin  -hold ACEi at this time given Cr  -can likely restart if remaining stable Yes

## 2023-04-05 NOTE — DISCHARGE NOTE PROVIDER - NSDCCPTREATMENT_GEN_ALL_CORE_FT
PRINCIPAL PROCEDURE  Procedure: Arthroplasty, knee, medial, unicompartmental  Findings and Treatment:

## 2023-04-05 NOTE — DISCHARGE NOTE PROVIDER - NSDCFUADDINST_GEN_ALL_CORE_FT
Keep knee straight while at rest. Elevate the leg as much as possible ("toes above the nose") to help control swelling. Make sure you get up and take a brief walk every two hours to help with circulation and prevent stiffness. Incentive spirometer 10X/hour. Cryocuff to help with pain/inflammation.  Keep Prineo Dressing Clean, Dry and Intact. May shower with Prineo Dressing. Please do not scrub, soak, peel or pick at the prineo dressing. No creams, lotions, or oils over dressing. May shower and let water run over incision, no baths. Pat dry once out of shower. Dressing to be removed in office at follow up visit in 2 weeks. There are no staples or stitches that need to be removed.  If you notice any redness, irritation, or itching around the prineo dressing call the surgeon's office  Per Dr. Douglas: may advance from walker as tolerated per discretion of physical therapist.   Keep knee straight while at rest. Elevate the leg as much as possible ("toes above the nose") to help control swelling. Make sure you get up and take a brief walk every two hours to help with circulation and prevent stiffness. Incentive spirometer 10X/hour. Cryocuff to help with pain/inflammation.    Keep Prineo Dressing Clean, Dry and Intact. May shower with Prineo Dressing. Please do not scrub, soak, peel or pick at the prineo dressing. No creams, lotions, or oils over dressing. May shower and let water run over incision, no baths. Pat dry once out of shower. Dressing to be removed in office at follow up visit in 2 weeks. There are no staples or stitches that need to be removed.  If you notice any redness, irritation, or itching around the prineo dressing call the surgeon's office    Per Dr. Douglas: may advance from walker as tolerated per discretion of physical therapist.

## 2023-04-05 NOTE — DISCHARGE NOTE PROVIDER - NSDCFUSCHEDAPPT_GEN_ALL_CORE_FT
Gregorio Douglas  [facility] Physician [facility]  [address]  Scheduled Appointment: 04/18/2023

## 2023-04-05 NOTE — OCCUPATIONAL THERAPY INITIAL EVALUATION ADULT - RANGE OF MOTION, PT EVAL
Patient will demonstrate increase AROM in right knee to WFL in order to perform toilet transfer independently within 3 weeks

## 2023-04-05 NOTE — DISCHARGE NOTE NURSING/CASE MANAGEMENT/SOCIAL WORK - PATIENT PORTAL LINK FT
You can access the FollowMyHealth Patient Portal offered by [facility] by registering at the following website: [email or web address]. By joining [facility]’s FollowMyHealth portal, you will also be able to view your health information using other applications (apps) compatible with our system.

## 2023-04-05 NOTE — PHYSICAL THERAPY INITIAL EVALUATION ADULT - ADDITIONAL COMMENTS
Pre-op confirmed: Patient lives with spouse in a private house with 2 entry steps without handrails. Once inside, pt has to negotiate a flight of stairs with15 steps, right ascending handrail, to access the bedroom and bathroom. Pt plans to recuperate on the 1st floor, where there is a bedroom and bathroom  This bathroom has a tub/shower combination, fixed shower head and standard toilet with adequate space to fit a commode over it.  At this time, pt is functioning in his  roles, self sufficient, driving & ambulating independently in the community without any assistive devices. Pt uses a soft left knee brace to prevent buckling.   Pt is right hand dominant and wears glasses for reading.

## 2023-04-05 NOTE — DISCHARGE NOTE PROVIDER - NSDCMRMEDTOKEN_GEN_ALL_CORE_FT
CeleBREX: 1 tab(s) orally once a day  Crestor 10 mg oral tablet: 1 tab(s) orally once a day  fenofibrate 160 mg oral tablet: 1 tab(s) orally once a day  mupirocin 2% topical ointment: Apply topically to affected area once a day (at bedtime) MDD:2  omeprazole 40 mg oral delayed release capsule: 1 cap(s) orally once a day  oxyCODONE 5 mg oral tablet: 1 tab(s) orally every 4 hours as needed for  moderate pain 2 tabs for severe pain MDD: 10   acetaminophen 325 mg oral tablet: 3 tab(s) orally every 8 hours as needed for pain/fever  aspirin 81 mg oral delayed release tablet: 1 tab(s) orally 2 times a day for 30 days MDD: 2 tablets  CeleBREX: 1 tab(s) orally once a day  celecoxib 200 mg oral capsule: 1 cap(s) orally every 12 hours MDD: 2 tablets  Crestor 10 mg oral tablet: 1 tab(s) orally once a day  fenofibrate 160 mg oral tablet: 1 tab(s) orally once a day  Multiple Vitamins oral tablet: 1 tab(s) orally once a day  mupirocin 2% topical ointment: Apply topically to affected area once a day (at bedtime) MDD:2  Narcan 4 mg/0.1 mL nasal spray: 4 milligram(s) intranasally once , repeat as necessary. as needed for suspected opiate overdose MDD: 0.2ml  omeprazole 40 mg oral delayed release capsule: 1 cap(s) orally once a day  ondansetron 4 mg oral tablet: 1 tab(s) orally every 6 hours MDD: 3  ondansetron 4 mg oral tablet: 1 tab(s) orally every 6 hours MDD: 4 tablets  oxyCODONE 5 mg oral tablet: 1 tab(s) orally every 4 hours as needed for  moderate pain 2 tabs for severe pain MDD: 10  senna leaf extract oral tablet: 2 tab(s) orally once a day (at bedtime)   acetaminophen 325 mg oral tablet: 3 tab(s) orally every 8 hours as needed for pain/fever  aspirin 81 mg oral delayed release tablet: 1 tab(s) orally 2 times a day for 30 days MDD: 2 tablets  celecoxib 200 mg oral capsule: 1 cap(s) orally every 12 hours MDD: 2 tablets  Crestor 10 mg oral tablet: 1 tab(s) orally once a day  fenofibrate 160 mg oral tablet: 1 tab(s) orally once a day  Multiple Vitamins oral tablet: 1 tab(s) orally once a day  Narcan 4 mg/0.1 mL nasal spray: 4 milligram(s) intranasally once , repeat as necessary. as needed for suspected opiate overdose MDD: 0.2ml  omeprazole 40 mg oral delayed release capsule: 1 cap(s) orally once a day  ondansetron 4 mg oral tablet: 1 tab(s) orally every 6 hours MDD: 4 tablets  oxyCODONE 10 mg oral tablet: 1 tab(s) orally every 4 hours as needed for pain 6-10 MDD: 6 tablets  senna leaf extract oral tablet: 2 tab(s) orally once a day (at bedtime)

## 2023-04-05 NOTE — OCCUPATIONAL THERAPY INITIAL EVALUATION ADULT - GENERAL OBSERVATIONS, REHAB EVAL
Chart reviewed and event noted to date. Patient encountered sitting in recliner chair, NAD, A&Ox4, WBAT RLE s/p R unilateral knee arthroplasty, +IV heplock, +ace wrap to R knee C/D/I. Patient agreeable to OT session.

## 2023-04-05 NOTE — DISCHARGE NOTE PROVIDER - NSDCFUADDAPPT_GEN_ALL_CORE_FT
Follow up with your surgeon in two weeks. Call for appointment.  If you need more pain medication, call your surgeon's office. For medication refills or authorizations, please call [phone] xt [phone]  We recommend that you call and schedule a follow up appointment within 2-4 weeks with your primary care physician for repeat blood work (CBC and BMP) for post hospital discharge follow-up care.  Call your surgeon if you have increased redness/pain/drainage or fever. Return to ER for shortness of breath/calf tenderness.   Follow up with your surgeon in two weeks. Call for appointment.    If you need more pain medication, call your surgeon's office. For medication refills or authorizations, please call [phone] xt [phone]    We recommend that you call and schedule a follow up appointment within 2-4 weeks with your primary care physician for repeat blood work (CBC and BMP) for post hospital discharge follow-up care.    Call your surgeon if you have increased redness/pain/drainage or fever. Return to ER for shortness of breath/calf tenderness.

## 2023-04-06 VITALS
RESPIRATION RATE: 18 BRPM | OXYGEN SATURATION: 98 % | HEART RATE: 90 BPM | DIASTOLIC BLOOD PRESSURE: 86 MMHG | SYSTOLIC BLOOD PRESSURE: 144 MMHG

## 2023-04-06 LAB
ANION GAP SERPL CALC-SCNC: 5 MMOL/L — SIGNIFICANT CHANGE UP (ref 5–17)
BUN SERPL-MCNC: 15 MG/DL — SIGNIFICANT CHANGE UP (ref 7–23)
CALCIUM SERPL-MCNC: 8.9 MG/DL — SIGNIFICANT CHANGE UP (ref 8.5–10.1)
CHLORIDE SERPL-SCNC: 108 MMOL/L — SIGNIFICANT CHANGE UP (ref 96–108)
CO2 SERPL-SCNC: 25 MMOL/L — SIGNIFICANT CHANGE UP (ref 22–31)
CREAT SERPL-MCNC: 0.84 MG/DL — SIGNIFICANT CHANGE UP (ref 0.5–1.3)
EGFR: 107 ML/MIN/1.73M2 — SIGNIFICANT CHANGE UP
GLUCOSE SERPL-MCNC: 130 MG/DL — HIGH (ref 70–99)
HCT VFR BLD CALC: 32.9 % — LOW (ref 39–50)
HGB BLD-MCNC: 11 G/DL — LOW (ref 13–17)
MCHC RBC-ENTMCNC: 28.9 PG — SIGNIFICANT CHANGE UP (ref 27–34)
MCHC RBC-ENTMCNC: 33.4 G/DL — SIGNIFICANT CHANGE UP (ref 32–36)
MCV RBC AUTO: 86.6 FL — SIGNIFICANT CHANGE UP (ref 80–100)
NRBC # BLD: 0 /100 WBCS — SIGNIFICANT CHANGE UP (ref 0–0)
PLATELET # BLD AUTO: 283 K/UL — SIGNIFICANT CHANGE UP (ref 150–400)
POTASSIUM SERPL-MCNC: 3.6 MMOL/L — SIGNIFICANT CHANGE UP (ref 3.5–5.3)
POTASSIUM SERPL-SCNC: 3.6 MMOL/L — SIGNIFICANT CHANGE UP (ref 3.5–5.3)
RBC # BLD: 3.8 M/UL — LOW (ref 4.2–5.8)
RBC # FLD: 13.2 % — SIGNIFICANT CHANGE UP (ref 10.3–14.5)
SODIUM SERPL-SCNC: 138 MMOL/L — SIGNIFICANT CHANGE UP (ref 135–145)
WBC # BLD: 14.98 K/UL — HIGH (ref 3.8–10.5)
WBC # FLD AUTO: 14.98 K/UL — HIGH (ref 3.8–10.5)

## 2023-04-06 RX ORDER — OXYCODONE HYDROCHLORIDE 5 MG/1
1 TABLET ORAL
Qty: 42 | Refills: 0
Start: 2023-04-06 | End: 2023-04-12

## 2023-04-06 RX ORDER — ONDANSETRON 8 MG/1
1 TABLET, FILM COATED ORAL
Qty: 28 | Refills: 0
Start: 2023-04-06 | End: 2023-04-12

## 2023-04-06 RX ORDER — CELECOXIB 200 MG/1
1 CAPSULE ORAL
Qty: 0 | Refills: 0 | DISCHARGE

## 2023-04-06 RX ORDER — ACETAMINOPHEN 500 MG
3 TABLET ORAL
Qty: 0 | Refills: 0 | DISCHARGE
Start: 2023-04-06

## 2023-04-06 RX ORDER — ASPIRIN/CALCIUM CARB/MAGNESIUM 324 MG
1 TABLET ORAL
Qty: 60 | Refills: 0
Start: 2023-04-06 | End: 2023-05-05

## 2023-04-06 RX ORDER — SENNA PLUS 8.6 MG/1
2 TABLET ORAL
Qty: 0 | Refills: 0 | DISCHARGE
Start: 2023-04-06

## 2023-04-06 RX ORDER — CELECOXIB 200 MG/1
1 CAPSULE ORAL
Qty: 60 | Refills: 0
Start: 2023-04-06 | End: 2023-05-05

## 2023-04-06 RX ORDER — NALOXONE HYDROCHLORIDE 4 MG/.1ML
4 SPRAY NASAL
Qty: 1 | Refills: 0
Start: 2023-04-06 | End: 2023-04-06

## 2023-04-06 RX ADMIN — Medication 975 MILLIGRAM(S): at 06:21

## 2023-04-06 RX ADMIN — PANTOPRAZOLE SODIUM 40 MILLIGRAM(S): 20 TABLET, DELAYED RELEASE ORAL at 05:22

## 2023-04-06 RX ADMIN — Medication 1 TABLET(S): at 12:21

## 2023-04-06 RX ADMIN — OXYCODONE HYDROCHLORIDE 5 MILLIGRAM(S): 5 TABLET ORAL at 01:35

## 2023-04-06 RX ADMIN — Medication 102 MILLIGRAM(S): at 05:22

## 2023-04-06 RX ADMIN — Medication 30 MILLIGRAM(S): at 10:30

## 2023-04-06 RX ADMIN — OXYCODONE HYDROCHLORIDE 5 MILLIGRAM(S): 5 TABLET ORAL at 06:22

## 2023-04-06 RX ADMIN — Medication 975 MILLIGRAM(S): at 13:29

## 2023-04-06 RX ADMIN — OXYCODONE HYDROCHLORIDE 5 MILLIGRAM(S): 5 TABLET ORAL at 11:40

## 2023-04-06 RX ADMIN — Medication 975 MILLIGRAM(S): at 05:21

## 2023-04-06 RX ADMIN — Medication 975 MILLIGRAM(S): at 14:24

## 2023-04-06 RX ADMIN — OXYCODONE HYDROCHLORIDE 5 MILLIGRAM(S): 5 TABLET ORAL at 02:35

## 2023-04-06 RX ADMIN — CELECOXIB 200 MILLIGRAM(S): 200 CAPSULE ORAL at 06:22

## 2023-04-06 RX ADMIN — CELECOXIB 200 MILLIGRAM(S): 200 CAPSULE ORAL at 05:22

## 2023-04-06 RX ADMIN — Medication 30 MILLIGRAM(S): at 10:15

## 2023-04-06 RX ADMIN — OXYCODONE HYDROCHLORIDE 5 MILLIGRAM(S): 5 TABLET ORAL at 10:42

## 2023-04-06 RX ADMIN — OXYCODONE HYDROCHLORIDE 5 MILLIGRAM(S): 5 TABLET ORAL at 05:22

## 2023-04-06 RX ADMIN — Medication 100 MILLIGRAM(S): at 01:35

## 2023-04-06 RX ADMIN — SODIUM CHLORIDE 125 MILLILITER(S): 9 INJECTION INTRAMUSCULAR; INTRAVENOUS; SUBCUTANEOUS at 01:34

## 2023-04-06 RX ADMIN — Medication 81 MILLIGRAM(S): at 05:21

## 2023-04-06 NOTE — PROGRESS NOTE ADULT - SUBJECTIVE AND OBJECTIVE BOX
47yMale s/p Right unilateral Knee Replacement POD#1. Pt seen and examined in NAD. Pt c/o pain. Pt denies any new complaints. Pt denies CP/SOB/N/V/D/numbness/tingling/bowel or bladder dysfunction. (+) voids (+)tolerating PO Diet    PE: RLE: dressing c/d/i. +ROM ankle/toes. Calf: soft, compressible and nontender. DP/PT 2+ NVI.                           11.0   14.98 )-----------( 283      ( 06 Apr 2023 06:55 )             32.9       04-06    138  |  108  |  15  ----------------------------<  130<H>  3.6   |  25  |  0.84    Ca    8.9      06 Apr 2023 06:55          A/P: 47yMale s/p Right unilateral knee replacement  ACE removed  Pain control prn, will give toradol and oxycodone now  PT: WBAT   DVT ppx: SCDs and ASA BID  Wound care, Isometric exercises, incentive spirometry   Discharge: planning Home today  All the above discussed and understood by pt   
Patient is 47y y/o Male s/p R [facility] POD#0  Patient is seen and examined at bedside.   Pt tolerated procedure well without any intra-op complications.    Pain is controlled.  Denies CP/SOB/Dizziness/N/V/D/HA.     Vital Signs Last 24 Hrs  T(C): 36.6 (05 Apr 2023 13:29), Max: 36.8 (05 Apr 2023 11:40)  T(F): 97.8 (05 Apr 2023 13:29), Max: 98.2 (05 Apr 2023 11:40)  HR: 80 (05 Apr 2023 13:29) (72 - 89)  BP: 125/82 (05 Apr 2023 13:29) (107/71 - 136/81)  BP(mean): --  RR: 16 (05 Apr 2023 13:29) (12 - 20)  SpO2: 97% (05 Apr 2023 13:29) (94% - 97%)    Parameters below as of 05 Apr 2023 13:29  Patient On (Oxygen Delivery Method): room air          PHYSICAL EXAM:  General: A&Ox3 NAD  RLE: Dressing C/D/I with ACE wrap in place. Motor intact + EHL/FHL/TA/GS.  Sensation is grossly intact.  Extremity warm, compartments soft, compressible. No calf tenderness. DP 2+   LLE: Motor intact +EHL/FHL/TA/GS. Sensation is grossly intact. Extremity warm, compartments soft, compressible. No calf tenderness. DP2+    Labs:                          11.2   8.01  )-----------( 271      ( 05 Apr 2023 12:05 )             34.1       04-05    141  |  113<H>  |  15  ----------------------------<  105<H>  4.1   |  24  |  0.97    Ca    9.1      05 Apr 2023 12:05        A/P: Patient is a 47y y/o Male s/p R [facility] POD#0  -wound care, knee extension/leg elevation, cryocuff, isometric exercises, new medications reviewed with pt  -Pain control/analgesia  -Inc spirometry reviewed with pt, demonstrated competence  -DVT prophylaxis with Venodynes/Aspirin 81 BID  -F/U AM Labs  -PT/OT/WBAT  -prophylactic Antibiotic  -medical consult  -DC planning

## 2023-04-10 ENCOUNTER — TRANSCRIPTION ENCOUNTER (OUTPATIENT)
Age: 48
End: 2023-04-10

## 2023-04-17 DIAGNOSIS — Z87.891 PERSONAL HISTORY OF NICOTINE DEPENDENCE: ICD-10-CM

## 2023-04-17 DIAGNOSIS — M17.11 UNILATERAL PRIMARY OSTEOARTHRITIS, RIGHT KNEE: ICD-10-CM

## 2023-04-17 DIAGNOSIS — K21.9 GASTRO-ESOPHAGEAL REFLUX DISEASE WITHOUT ESOPHAGITIS: ICD-10-CM

## 2023-04-17 DIAGNOSIS — Z88.0 ALLERGY STATUS TO PENICILLIN: ICD-10-CM

## 2023-04-17 DIAGNOSIS — E78.5 HYPERLIPIDEMIA, UNSPECIFIED: ICD-10-CM

## 2023-04-18 ENCOUNTER — APPOINTMENT (OUTPATIENT)
Dept: ORTHOPEDIC SURGERY | Facility: CLINIC | Age: 48
End: 2023-04-18
Payer: COMMERCIAL

## 2023-04-18 VITALS — BODY MASS INDEX: 25.1 KG/M2 | WEIGHT: 147 LBS | HEIGHT: 64 IN

## 2023-04-18 PROCEDURE — 73562 X-RAY EXAM OF KNEE 3: CPT | Mod: RT

## 2023-04-18 PROCEDURE — 99024 POSTOP FOLLOW-UP VISIT: CPT

## 2023-04-18 NOTE — ASSESSMENT
[FreeTextEntry1] : Previous doc:\par due to acute flare up of OA and loose body - with acute effusion -- will try aspiration and injection today hold on HA injectiondue to severe pain and swelling. \par 11/1/22: Medial sided OA. Pain is affecting his ADL's as well as playing with his kids, walking and stairs. He has failed conservative treatment and is indicated for UKA. Discussed this with his wife present. He is unsure of when he would like to proceed but will speak to Mirian regarding dates. Risks and benefits discussed and all questions answered.  \par 2/7/23: Cont pain and effusion - planning for UKA in april, needs CT.  Cannot aspirate due to timing of surgery.  Recent cortisone inj 6 weeks ago.  Will cont celebrex, ice, compression sleeve.\par \par 4/18/23: S/P right knee UKA on 4/5/23. First post op. Doing well. Continue PT/HEP, ice, elevation, meds as needed.

## 2023-04-18 NOTE — PHYSICAL EXAM
[Right] : right knee [Components well fixed, in good position] : Components well fixed, in good position [de-identified] : Right knee\par Incision clean, dry and intact\par Calf soft, non-tender\par ROM 0-90

## 2023-04-18 NOTE — IMAGING
[de-identified] : right knee:\par moderate effusion\par guarding with ROM 0-115\par medial tenderness \par NVI \par 5/5

## 2023-04-18 NOTE — DISCUSSION/SUMMARY
[de-identified] : The patient was advised of the diagnosis.  The natural history of the pathology was explained in full to the patient in layman's terms. All questions were answered.  The risks and benefits of surgical and non-surgical treatment alternatives were explained in full to the patient.\par

## 2023-04-18 NOTE — HISTORY OF PRESENT ILLNESS
[] : Post Surgical Visit: yes [de-identified] : 4/18/23: 2 weeks postop R UKA - He has pain radiating from the knee to the back as well as pain in the thigh. Denies fevers/chills. He is making progress with at home PT. \par \par Prev doc:\par Pt known to Dr Hathaway for rt knee OA - with MRI showing medial wear and effusion - no hx of inj in the past - pain was steady but recently did a lot of walking recently sig more pain and swelling - had aspiration last year that did help --- \par 11/1/22: Continues to have right knee pain as well as swelling. Also has shooting pains in the knee. No relief from HA injections, CSI and PT. Also having difficulty with commuting to work. Cannot play with his kids and pain inhibits his ADL. Worse with stairs and prolonged walking. \par  [de-identified] : 4/5/23 [de-identified] : NELLA PEREIRA

## 2023-04-25 ENCOUNTER — LABORATORY RESULT (OUTPATIENT)
Age: 48
End: 2023-04-25

## 2023-04-25 ENCOUNTER — APPOINTMENT (OUTPATIENT)
Dept: ORTHOPEDIC SURGERY | Facility: CLINIC | Age: 48
End: 2023-04-25
Payer: COMMERCIAL

## 2023-04-25 VITALS — WEIGHT: 147 LBS | HEIGHT: 64 IN | BODY MASS INDEX: 25.1 KG/M2

## 2023-04-25 PROCEDURE — 99024 POSTOP FOLLOW-UP VISIT: CPT

## 2023-04-25 PROCEDURE — 20611 DRAIN/INJ JOINT/BURSA W/US: CPT | Mod: 58

## 2023-04-25 PROCEDURE — 73562 X-RAY EXAM OF KNEE 3: CPT | Mod: RT

## 2023-04-25 NOTE — ASSESSMENT
[FreeTextEntry1] : Previous doc:\par due to acute flare up of OA and loose body - with acute effusion -- will try aspiration and injection today hold on HA injectiondue to severe pain and swelling. \par 11/1/22: Medial sided OA. Pain is affecting his ADL's as well as playing with his kids, walking and stairs. He has failed conservative treatment and is indicated for UKA. Discussed this with his wife present. He is unsure of when he would like to proceed but will speak to Mirian regarding dates. Risks and benefits discussed and all questions answered.  \par 2/7/23: Cont pain and effusion - planning for UKA in april, needs CT.  Cannot aspirate due to timing of surgery.  Recent cortisone inj 6 weeks ago.  Will cont celebrex, ice, compression sleeve.\par 4/18/23: S/P right knee UKA on 4/5/23. First post op. Doing well. Continue PT/HEP, ice, elevation, meds as needed. \par \par 4/25/23: 3 weeks s/p R UKA - He does have an effusion today and sig tenderness over the past 3 days. Will asp and send fluid to labs to R/O infection. XR with components well fixed.  min reddness but hypersensitivity

## 2023-04-25 NOTE — PHYSICAL EXAM
[Right] : right knee [AP] : anteroposterior [Lateral] : lateral [[place]] : skyline [Components well fixed, in good position] : Components well fixed, in good position [de-identified] : Right knee\par Incision clean, dry and intact\par Calf soft, non-tender\par ROM 0-90\par small effusion

## 2023-04-25 NOTE — HISTORY OF PRESENT ILLNESS
[Dull/Aching] : dull/aching [] : Post Surgical Visit: yes [de-identified] : 4/25/23: 3 weeks s/p R UKA - Over the past 3 days, he started to have a significant amount of pain and swelling in the knee and difficulty sleeping. Denies fevers, has chills. \par \par Prev doc:\par Pt known to Dr Hathaway for rt knee OA - with MRI showing medial wear and effusion - no hx of inj in the past - pain was steady but recently did a lot of walking recently sig more pain and swelling - had aspiration last year that did help --- \par 11/1/22: Continues to have right knee pain as well as swelling. Also has shooting pains in the knee. No relief from HA injections, CSI and PT. Also having difficulty with commuting to work. Cannot play with his kids and pain inhibits his ADL. Worse with stairs and prolonged walking. \par 4/18/23: 2 weeks postop R UKA - He has pain radiating from the knee to the back as well as pain in the thigh. Denies fevers/chills. He is making progress with at home PT.  [de-identified] : 45/23  [de-identified] : NELLA PEREIRA

## 2023-04-25 NOTE — PROCEDURE
[FreeTextEntry3] : Aspiration of the right knee was performed using an 18-gauge needle under sterile conditions 8 cc of bloody fluid was aspirated\par (this was sent for cell count, culture, gram stain, and crystals)\par

## 2023-05-02 ENCOUNTER — APPOINTMENT (OUTPATIENT)
Dept: ORTHOPEDIC SURGERY | Facility: CLINIC | Age: 48
End: 2023-05-02
Payer: COMMERCIAL

## 2023-05-02 VITALS — WEIGHT: 147 LBS | BODY MASS INDEX: 25.1 KG/M2 | HEIGHT: 64 IN

## 2023-05-02 PROCEDURE — 99024 POSTOP FOLLOW-UP VISIT: CPT

## 2023-05-02 NOTE — DISCUSSION/SUMMARY
[de-identified] : The incision was inspected and was well healed.  The patient was instructed to call for fevers, chills, wound drainage, wound opening, redness, or any other concerns. The patient was advised of the diagnosis.  The natural history of the pathology was explained in full to the patient in layman's terms. All questions were answered.  The risks and benefits of surgical and non-surgical treatment alternatives were explained in full to the patient. \par The patient is doing well at this time. The patient will be started on a course of physical therapy. I recommended that the patient works on range of motion at home and was shown how to do this. I encouraged the patient to increase ambulation. The patient can continue to take Tylenol for occasional discomfort. The patient was advised not to do any dental work for the first three months following the surgery. We will see the patient  back for a follow-up for a repeat evaluation. The patient  will call or return earlier for any questions or concerns.  Signs and symptoms of infection reviewed and patient advised to call immediately for redness, fevers, and/or chills.\par \par Entered by Janeen Rosario acting as scribe.\par

## 2023-05-02 NOTE — ASSESSMENT
[FreeTextEntry1] : Previous doc:\par due to acute flare up of OA and loose body - with acute effusion -- will try aspiration and injection today hold on HA injectiondue to severe pain and swelling. \par 11/1/22: Medial sided OA. Pain is affecting his ADL's as well as playing with his kids, walking and stairs. He has failed conservative treatment and is indicated for UKA. Discussed this with his wife present. He is unsure of when he would like to proceed but will speak to Mirian regarding dates. Risks and benefits discussed and all questions answered.  \par 2/7/23: Cont pain and effusion - planning for UKA in april, needs CT.  Cannot aspirate due to timing of surgery.  Recent cortisone inj 6 weeks ago.  Will cont celebrex, ice, compression sleeve.\par 4/18/23: S/P right knee UKA on 4/5/23. First post op. Doing well. Continue PT/HEP, ice, elevation, meds as needed. \par 4/25/23: 3 weeks s/p R UKA - He does have an effusion today and sig tenderness over the past 3 days. Will asp and send fluid to labs to R/O infection. XR with components well fixed.  min reddness but hypersensitivity \par \par 5/2/23: 4 weeks s/p R UKA - He continues to have an effusion and tenderness. Asp was negative, no obvious signs of infection. He will stop aspirin and start Tramadol and MDP. Will also restart PT and FU in 2 weeks.

## 2023-05-02 NOTE — HISTORY OF PRESENT ILLNESS
[Dull/Aching] : dull/aching [] : Post Surgical Visit: yes [de-identified] : 5/2/32: Continued pain in R UKA says not any better- He has issues with sitting for prolonged periods of time. Asp was negative he felt better for a period - has troble staying asleep but is back to work  \par \par Prev doc:\par Pt known to Dr Hathaway for rt knee OA - with MRI showing medial wear and effusion - no hx of inj in the past - pain was steady but recently did a lot of walking recently sig more pain and swelling - had aspiration last year that did help --- \par 11/1/22: Continues to have right knee pain as well as swelling. Also has shooting pains in the knee. No relief from HA injections, CSI and PT. Also having difficulty with commuting to work. Cannot play with his kids and pain inhibits his ADL. Worse with stairs and prolonged walking. \par 4/18/23: 2 weeks postop R UKA - He has pain radiating from the knee to the back as well as pain in the thigh. Denies fevers/chills. He is making progress with at home PT. \par 4/25/23: 3 weeks s/p R UKA - Over the past 3 days, he started to have a significant amount of pain and swelling in the knee and difficulty sleeping. Denies fevers, has chills.  [de-identified] : 4/5/23 [de-identified] : NELLA DYER

## 2023-05-02 NOTE — PHYSICAL EXAM
[de-identified] : Right knee\par Incision well healed\par Calf soft, non-tender\par ROM 0-90\par small effusion \par cane

## 2023-05-16 ENCOUNTER — APPOINTMENT (OUTPATIENT)
Dept: ORTHOPEDIC SURGERY | Facility: CLINIC | Age: 48
End: 2023-05-16
Payer: COMMERCIAL

## 2023-05-16 ENCOUNTER — RESULT CHARGE (OUTPATIENT)
Age: 48
End: 2023-05-16

## 2023-05-16 VITALS — BODY MASS INDEX: 25.1 KG/M2 | HEIGHT: 64 IN | WEIGHT: 147 LBS

## 2023-05-16 PROCEDURE — 73562 X-RAY EXAM OF KNEE 3: CPT | Mod: RT

## 2023-05-16 PROCEDURE — 99024 POSTOP FOLLOW-UP VISIT: CPT

## 2023-05-16 NOTE — HISTORY OF PRESENT ILLNESS
[4] : 4 [de-identified] : 5/16/23: Feels worsening medial knee pain.  Took MDP which helped a little while taking meds.  Does not feel better than before surgery yet.\par \par Prev doc:\par Pt known to Dr Hathaway for rt knee OA - with MRI showing medial wear and effusion - no hx of inj in the past - pain was steady but recently did a lot of walking recently sig more pain and swelling - had aspiration last year that did help --- \par 11/1/22: Continues to have right knee pain as well as swelling. Also has shooting pains in the knee. No relief from HA injections, CSI and PT. Also having difficulty with commuting to work. Cannot play with his kids and pain inhibits his ADL. Worse with stairs and prolonged walking. \par 4/18/23: 2 weeks postop R UKA - He has pain radiating from the knee to the back as well as pain in the thigh. Denies fevers/chills. He is making progress with at home PT. \par 4/25/23: 3 weeks s/p R UKA - Over the past 3 days, he started to have a significant amount of pain and swelling in the knee and difficulty sleeping. Denies fevers, has chills. \par 5/2/32: Continued pain in R UKA says not any better- He has issues with sitting for prolonged periods of time. Asp was negative he felt better for a period - has troble staying asleep but is back to work   [FreeTextEntry1] : RT Knee  [FreeTextEntry5] : Antwan is a 47 year M, Pain when walking and sitting. Pt is doing PT.

## 2023-05-16 NOTE — ASSESSMENT
[FreeTextEntry1] : Previous doc:\par due to acute flare up of OA and loose body - with acute effusion -- will try aspiration and injection today hold on HA injectiondue to severe pain and swelling. \par 11/1/22: Medial sided OA. Pain is affecting his ADL's as well as playing with his kids, walking and stairs. He has failed conservative treatment and is indicated for UKA. Discussed this with his wife present. He is unsure of when he would like to proceed but will speak to Mirian regarding dates. Risks and benefits discussed and all questions answered.  \par 2/7/23: Cont pain and effusion - planning for UKA in april, needs CT.  Cannot aspirate due to timing of surgery.  Recent cortisone inj 6 weeks ago.  Will cont celebrex, ice, compression sleeve.\par 4/18/23: S/P right knee UKA on 4/5/23. First post op. Doing well. Continue PT/HEP, ice, elevation, meds as needed. \par 4/25/23: 3 weeks s/p R UKA - He does have an effusion today and sig tenderness over the past 3 days. Will asp and send fluid to labs to R/O infection. XR with components well fixed.  min reddness but hypersensitivity \par 5/2/23: 4 weeks s/p R UKA - He continues to have an effusion and tenderness. Asp was negative, no obvious signs of infection. He will stop aspirin and start Tramadol and MDP. Will also restart PT and FU in 2 weeks. \par \par 5/16/23: IOmproving function but still complaining of medial pain, feels worse.  Effusion resolving, XR well fixed, no movement of implant seen.  Venous doppler r/o DVT, MRI eval tiba stress reaction.

## 2023-05-16 NOTE — PHYSICAL EXAM
[Right] : right knee [AP] : anteroposterior [Lateral] : lateral [[place]] : [place] [Components well fixed, in good position] : Components well fixed, in good position [de-identified] : Right knee\par Incision well healed\par Calf soft, non-tender\par ROM 0-120\par Mild effusion \par Walks without assistance.

## 2023-05-16 NOTE — DISCUSSION/SUMMARY
[de-identified] : The patient was advised of the diagnosis.  The natural history of the pathology was explained in full to the patient in layman's terms. All questions were answered.  The risks and benefits of surgical and non-surgical treatment alternatives were explained in full to the patient.\par

## 2023-05-20 ENCOUNTER — FORM ENCOUNTER (OUTPATIENT)
Age: 48
End: 2023-05-20

## 2023-05-21 ENCOUNTER — APPOINTMENT (OUTPATIENT)
Dept: MRI IMAGING | Facility: CLINIC | Age: 48
End: 2023-05-21
Payer: COMMERCIAL

## 2023-05-21 PROCEDURE — 73721 MRI JNT OF LWR EXTRE W/O DYE: CPT | Mod: RT

## 2023-06-06 ENCOUNTER — APPOINTMENT (OUTPATIENT)
Dept: ORTHOPEDIC SURGERY | Facility: CLINIC | Age: 48
End: 2023-06-06
Payer: COMMERCIAL

## 2023-06-06 ENCOUNTER — NON-APPOINTMENT (OUTPATIENT)
Age: 48
End: 2023-06-06

## 2023-06-06 VITALS — WEIGHT: 147 LBS | HEIGHT: 64 IN | BODY MASS INDEX: 25.1 KG/M2

## 2023-06-06 PROCEDURE — 99024 POSTOP FOLLOW-UP VISIT: CPT

## 2023-06-06 NOTE — IMAGING
[de-identified] : Right knee\par Incision well healed\par Calf soft, non-tender\par ROM 0-120\par Mild effusion \par Walks without assistance.\par \par MRI - \par \par effusion - good postion - \par has some tibial edema below prosthetic

## 2023-06-06 NOTE — HISTORY OF PRESENT ILLNESS
[5] : 5 [Dull/Aching] : dull/aching [de-identified] : 6/6/23: RT Knee MRI F/U. Pain is slightly better than last appointment, Pt is taken Tylenol or celebrex to help with pain. Pt has some stiffness. Overall improved from last visit. celebrex helping only one dose of oxy and no tramadol \par \par Prev doc:\par Pt known to Dr Hathaway for rt knee OA - with MRI showing medial wear and effusion - no hx of inj in the past - pain was steady but recently did a lot of walking recently sig more pain and swelling - had aspiration last year that did help --- \par 11/1/22: Continues to have right knee pain as well as swelling. Also has shooting pains in the knee. No relief from HA injections, CSI and PT. Also having difficulty with commuting to work. Cannot play with his kids and pain inhibits his ADL. Worse with stairs and prolonged walking. \par 4/18/23: 2 weeks postop R UKA - He has pain radiating from the knee to the back as well as pain in the thigh. Denies fevers/chills. He is making progress with at home PT. \par 4/25/23: 3 weeks s/p R UKA - Over the past 3 days, he started to have a significant amount of pain and swelling in the knee and difficulty sleeping. Denies fevers, has chills. \par 5/2/32: Continued pain in R UKA says not any better- He has issues with sitting for prolonged periods of time. Asp was negative he felt better for a period - has troble staying asleep but is back to work  \par 5/16/23: Feels worsening medial knee pain.  Took MDP which helped a little while taking meds.  Does not feel better than before surgery yet.

## 2023-06-06 NOTE — ASSESSMENT
[FreeTextEntry1] : Previous doc:\par due to acute flare up of OA and loose body - with acute effusion -- will try aspiration and injection today hold on HA injectiondue to severe pain and swelling. \par 11/1/22: Medial sided OA. Pain is affecting his ADL's as well as playing with his kids, walking and stairs. He has failed conservative treatment and is indicated for UKA. Discussed this with his wife present. He is unsure of when he would like to proceed but will speak to Mirian regarding dates. Risks and benefits discussed and all questions answered.  \par 2/7/23: Cont pain and effusion - planning for UKA in april, needs CT.  Cannot aspirate due to timing of surgery.  Recent cortisone inj 6 weeks ago.  Will cont celebrex, ice, compression sleeve.\par 4/18/23: S/P right knee UKA on 4/5/23. First post op. Doing well. Continue PT/HEP, ice, elevation, meds as needed. \par 4/25/23: 3 weeks s/p R UKA - He does have an effusion today and sig tenderness over the past 3 days. Will asp and send fluid to labs to R/O infection. XR with components well fixed.  min reddness but hypersensitivity \par 5/2/23: 4 weeks s/p R UKA - He continues to have an effusion and tenderness. Asp was negative, no obvious signs of infection. He will stop aspirin and start Tramadol and MDP. Will also restart PT and FU in 2 weeks. \par 5/16/23: IOmproving function but still complaining of medial pain, feels worse.  Effusion resolving, XR well fixed, no movement of implant seen.  Venous doppler r/o DVT, MRI eval tiba stress reaction.\par \par 6/6/23: Significant improvement. pain possibly from Tibial edema. Pt will be sent for PT and F/U in 4 weeks.  This point I think he is functioning as I would expect 9 weeks post UKA I am very happy with his progress and I feel that we are out of the woods

## 2023-06-07 ENCOUNTER — NON-APPOINTMENT (OUTPATIENT)
Age: 48
End: 2023-06-07

## 2023-06-07 DIAGNOSIS — Z12.11 ENCOUNTER FOR SCREENING FOR MALIGNANT NEOPLASM OF COLON: ICD-10-CM

## 2023-06-07 DIAGNOSIS — Z86.010 PERSONAL HISTORY OF COLONIC POLYPS: ICD-10-CM

## 2023-06-07 DIAGNOSIS — Z80.0 ENCOUNTER FOR SCREENING FOR MALIGNANT NEOPLASM OF COLON: ICD-10-CM

## 2023-07-03 ENCOUNTER — APPOINTMENT (OUTPATIENT)
Dept: RADIOLOGY | Facility: CLINIC | Age: 48
End: 2023-07-03
Payer: COMMERCIAL

## 2023-07-03 ENCOUNTER — OUTPATIENT (OUTPATIENT)
Dept: OUTPATIENT SERVICES | Facility: HOSPITAL | Age: 48
LOS: 1 days | End: 2023-07-03
Payer: COMMERCIAL

## 2023-07-03 DIAGNOSIS — R05.9 COUGH, UNSPECIFIED: ICD-10-CM

## 2023-07-03 PROCEDURE — 71046 X-RAY EXAM CHEST 2 VIEWS: CPT | Mod: 26

## 2023-07-03 PROCEDURE — 71046 X-RAY EXAM CHEST 2 VIEWS: CPT

## 2023-07-11 ENCOUNTER — NON-APPOINTMENT (OUTPATIENT)
Age: 48
End: 2023-07-11

## 2023-07-11 ENCOUNTER — APPOINTMENT (OUTPATIENT)
Dept: ORTHOPEDIC SURGERY | Facility: CLINIC | Age: 48
End: 2023-07-11
Payer: COMMERCIAL

## 2023-07-11 VITALS — BODY MASS INDEX: 25.1 KG/M2 | WEIGHT: 147 LBS | HEIGHT: 64 IN

## 2023-07-11 PROCEDURE — 99213 OFFICE O/P EST LOW 20 MIN: CPT

## 2023-07-11 PROCEDURE — 73562 X-RAY EXAM OF KNEE 3: CPT | Mod: RT

## 2023-07-12 ENCOUNTER — LABORATORY RESULT (OUTPATIENT)
Age: 48
End: 2023-07-12

## 2023-07-12 ENCOUNTER — TRANSCRIPTION ENCOUNTER (OUTPATIENT)
Age: 48
End: 2023-07-12

## 2023-07-15 NOTE — ASSESSMENT
[FreeTextEntry1] : Previous doc:\par due to acute flare up of OA and loose body - with acute effusion -- will try aspiration and injection today hold on HA injectiondue to severe pain and swelling. \par 11/1/22: Medial sided OA. Pain is affecting his ADL's as well as playing with his kids, walking and stairs. He has failed conservative treatment and is indicated for UKA. Discussed this with his wife present. He is unsure of when he would like to proceed but will speak to Mirian regarding dates. Risks and benefits discussed and all questions answered.  \par 2/7/23: Cont pain and effusion - planning for UKA in april, needs CT.  Cannot aspirate due to timing of surgery.  Recent cortisone inj 6 weeks ago.  Will cont celebrex, ice, compression sleeve.\par 4/18/23: S/P right knee UKA on 4/5/23. First post op. Doing well. Continue PT/HEP, ice, elevation, meds as needed. \par 4/25/23: 3 weeks s/p R UKA - He does have an effusion today and sig tenderness over the past 3 days. Will asp and send fluid to labs to R/O infection. XR with components well fixed.  min reddness but hypersensitivity \par 5/2/23: 4 weeks s/p R UKA - He continues to have an effusion and tenderness. Asp was negative, no obvious signs of infection. He will stop aspirin and start Tramadol and MDP. Will also restart PT and FU in 2 weeks. \par 5/16/23: IOmproving function but still complaining of medial pain, feels worse.  Effusion resolving, XR well fixed, no movement of implant seen.  Venous doppler r/o DVT, MRI eval tiba stress reaction.\par 6/6/23: Significant improvement. pain possibly from Tibial edema. Pt will be sent for PT and F/U in 4 weeks.  This point I think he is functioning as I would expect 9 weeks post UKA I am very happy with his progress and I feel that we are out of the woods\par \par 7/11/23: Pt is gradually improving, still experiencing some pain- will continue with PT. Will send rx for bloodwork. Follow up in 4 weeks.  I do not see any sig sign of infection but will f/u his prior blood work - aspiration was allblood - no sign of loosening on xray - still unsre of reason for sig pain but knee looks and function well -  we will cont to closly monitor

## 2023-07-15 NOTE — HISTORY OF PRESENT ILLNESS
[8] : 8 [6] : 6 [Dull/Aching] : dull/aching [de-identified] : 7/11/23: 3 months s/p R UKA. Patient is still attending PT and is happy with progression and ROM but is still experiencing pain and worsens with walking. Patient has not been taking medication for pain recently due to liver enzyme concerns.\par \par Prev doc:\par Pt known to Dr Hathaway for rt knee OA - with MRI showing medial wear and effusion - no hx of inj in the past - pain was steady but recently did a lot of walking recently sig more pain and swelling - had aspiration last year that did help --- \par 11/1/22: Continues to have right knee pain as well as swelling. Also has shooting pains in the knee. No relief from HA injections, CSI and PT. Also having difficulty with commuting to work. Cannot play with his kids and pain inhibits his ADL. Worse with stairs and prolonged walking. \par 4/18/23: 2 weeks postop R UKA - He has pain radiating from the knee to the back as well as pain in the thigh. Denies fevers/chills. He is making progress with at home PT. \par 4/25/23: 3 weeks s/p R UKA - Over the past 3 days, he started to have a significant amount of pain and swelling in the knee and difficulty sleeping. Denies fevers, has chills. \par 5/2/32: Continued pain in R UKA says not any better- He has issues with sitting for prolonged periods of time. Asp was negative he felt better for a period - has troble staying asleep but is back to work  \par 5/16/23: Feels worsening medial knee pain.  Took MDP which helped a little while taking meds.  Does not feel better than before surgery yet.\par 6/6/23: RT Knee MRI F/U. Pain is slightly better than last appointment, Pt is taken Tylenol or celebrex to help with pain. Pt has some stiffness. Overall improved from last visit. celebrex helping only one dose of oxy and no tramadol

## 2023-07-15 NOTE — IMAGING
[de-identified] : Right knee\par Incision well healed\par Calf soft, non-tender\par ROM 0-142\par Mild effusion \par Walks without assistance.\par \par MRI - \par \par effusion - good postion - \par has some tibial edema below prosthetic

## 2023-07-27 ENCOUNTER — NON-APPOINTMENT (OUTPATIENT)
Age: 48
End: 2023-07-27

## 2023-07-28 ENCOUNTER — OUTPATIENT (OUTPATIENT)
Dept: OUTPATIENT SERVICES | Facility: HOSPITAL | Age: 48
LOS: 1 days | Discharge: HOME | End: 2023-07-28
Payer: COMMERCIAL

## 2023-07-28 ENCOUNTER — APPOINTMENT (OUTPATIENT)
Dept: GASTROENTEROLOGY | Facility: HOSPITAL | Age: 48
End: 2023-07-28

## 2023-07-28 VITALS
OXYGEN SATURATION: 100 % | TEMPERATURE: 98 F | HEART RATE: 60 BPM | WEIGHT: 145.06 LBS | SYSTOLIC BLOOD PRESSURE: 123 MMHG | HEIGHT: 64 IN | RESPIRATION RATE: 15 BRPM | DIASTOLIC BLOOD PRESSURE: 82 MMHG

## 2023-07-28 VITALS
RESPIRATION RATE: 12 BRPM | SYSTOLIC BLOOD PRESSURE: 100 MMHG | OXYGEN SATURATION: 97 % | HEART RATE: 59 BPM | DIASTOLIC BLOOD PRESSURE: 58 MMHG

## 2023-07-28 DIAGNOSIS — Z12.11 ENCOUNTER FOR SCREENING FOR MALIGNANT NEOPLASM OF COLON: ICD-10-CM

## 2023-07-28 PROCEDURE — 45378 DIAGNOSTIC COLONOSCOPY: CPT | Mod: GC

## 2023-07-28 RX ORDER — FENOFIBRATE,MICRONIZED 130 MG
1 CAPSULE ORAL
Qty: 0 | Refills: 0 | DISCHARGE

## 2023-07-28 RX ORDER — OMEPRAZOLE 10 MG/1
1 CAPSULE, DELAYED RELEASE ORAL
Qty: 0 | Refills: 0 | DISCHARGE

## 2023-07-28 RX ORDER — ROSUVASTATIN CALCIUM 5 MG/1
1 TABLET ORAL
Qty: 0 | Refills: 0 | DISCHARGE

## 2023-07-28 RX ORDER — SODIUM CHLORIDE 9 MG/ML
500 INJECTION, SOLUTION INTRAVENOUS
Refills: 0 | Status: DISCONTINUED | OUTPATIENT
Start: 2023-07-28 | End: 2023-08-11

## 2023-07-28 NOTE — ASU PATIENT PROFILE, ADULT - DOES PATIENT HAVE ADVANCE DIRECTIVE
Last OV: 2/12/20 hospital f/u    Upcoming OV: no upcoming appt.  Pt is due for PE as of 3/20     Latest labs: 2/1/19 Microalb, A1c, Lipid and CMP    Latest RX: GLYBURIDE 5 MG Tablet 90  Tabs 0 refills on 2/3/20    Per protocol, Glyburide failed due to [employer]
No

## 2023-07-28 NOTE — ASU PREOP CHECKLIST - PATIENT PROBLEMS/NEEDS
SUBJECTIVE:   Keesha Norton is a 30 year old male who presents to clinic today for the following health issues:    Chest Pain      Onset: 2 weeks    Description (location/character/radiation/duration): chest pain, back middle pain, presure in the back ,ringing in ears, behind head hurts, left arm numbeness    Intensity:  6/10    Accompanying signs and symptoms:        Shortness of breath: no        Sweating: no        Nausea/vomitting: no        Palpitations: no        Other (fevers/chills/cough/heartburn/lightheadedness): no     History (similar episodes/previous evaluation): None    Precipitating or alleviating factors:       Worse with exertion: no        Worse with breathing: no        Related to eating: no        Better with burping: YES- sometimes    Therapies tried and outcome: tried for stomach pain( Zantac)    Started with severe HA pain base of head/posterior neck. Now with left sided sharp chest pain that comes and goes. With the chest pain he will get left arm and left leg diffuse numbness. H/O GERD but not worse than usual. Now today BP is elevated and it never is. Chest pain lasts seconds to minutes. No dizziness, SHORTNESS OF BREATH, vomiting.          Allergies   Allergen Reactions     Seasonal Allergies        Past Medical History:   Diagnosis Date     Gastric ulcer 2015     Helicobacter pylori (H. pylori) infection          Current Outpatient Prescriptions on File Prior to Visit:  ranitidine (ZANTAC) 150 MG tablet Take 1 tablet (150 mg) by mouth 2 times daily   ciprofloxacin (CIPRO) 250 MG tablet      No current facility-administered medications on file prior to visit.     Social History   Substance Use Topics     Smoking status: Never Smoker     Smokeless tobacco: Never Used     Alcohol use Yes      Comment: may once a month       ROS:  General: negative for fever  Resp: chest pain as above  CV: + as above  ABD: Negative for abd pain.  Neurologic:negative for headache    OBJECTIVE:  BP (!)  "155/93 (BP Location: Left arm, Patient Position: Chair, Cuff Size: Adult Regular)  Pulse 85  Temp 96.9  F (36.1  C) (Tympanic)  Resp 20  Ht 5' 10.47\" (1.79 m)  Wt 159 lb (72.1 kg)  SpO2 100%  BMI 22.51 kg/m2   General:   awake, alert, and cooperative.  NAD.   Head: Normocephalic, atraumatic.  Eyes: Conjunctiva clear,   Heart: Regular rate and rhythm. No murmur.  Lungs: Chest is clear; no wheezes or rales.   Neuro: Alert and oriented - normal speech.  CN's 2-12 grossly intact.  Neck- NT, FROM with  Mild discomfort    EKG- Rate 77. I see no acute findings.    ASSESSMENT:      ICD-10-CM    1. Acute chest pain R07.9 EKG 12-lead complete w/read - Clinics   2. Numbness and tingling of left arm and leg R20.0     R20.2          PLAN: I have no idea what the etiology is for his symptoms. He needs further evaluation than we can do here. Differential- ? Anxiety, GERD, neck neuro impingement, AV malformation, cardiac etiology. He will go to the ER for further evaluation.  Sue Law PA-C                 " Patient expressed no known problems or needs

## 2023-08-11 NOTE — HISTORY OF PRESENT ILLNESS
PA was approved  Prescription picked up per [employer]  TUCKER Kiser [8] : 8 [2] : 2 [de-identified] : 11/1/22: Continues to have right knee pain as well as swelling. Also has shooting pains in the knee. No relief from HA injections, CSI and PT. Also having difficulty with commuting to work. Cannot play with his kids and pain inhibits his ADL. Worse with stairs and prolonged walking. \par \par Prev doc:\par Pt known to Dr Hathaway for rt knee OA - with MRI showing medial wear and effusion - no hx of inj in the past - pain was steady but recently did a lot of walking recently sig more pain and swelling - had aspiration last year that did help ---  [FreeTextEntry5] : pt here for consult to surgery, states he was seeing dr. ornelas,  \par pt states pain in walking

## 2023-08-15 ENCOUNTER — APPOINTMENT (OUTPATIENT)
Dept: ORTHOPEDIC SURGERY | Facility: CLINIC | Age: 48
End: 2023-08-15
Payer: COMMERCIAL

## 2023-08-15 VITALS — BODY MASS INDEX: 25.1 KG/M2 | HEIGHT: 64 IN | WEIGHT: 147 LBS

## 2023-08-15 PROCEDURE — 99213 OFFICE O/P EST LOW 20 MIN: CPT

## 2023-08-15 NOTE — IMAGING
[de-identified] : Right knee Incision well healed Calf soft, non-tender ROM 0-142 Mild effusion  Walks without assistance. anterior and medial pain  MRI -   effusion - good postion -  has some tibial edema below prosthetic

## 2023-08-15 NOTE — HISTORY OF PRESENT ILLNESS
[8] : 8 [6] : 6 [Dull/Aching] : dull/aching [de-identified] : 8/15/23: cont to have medial sided pain, feels that it is moving to some degree. but at times still has a lot of pain and stiffness  Prev doc: Pt known to Dr Hathaway for rt knee OA - with MRI showing medial wear and effusion - no hx of inj in the past - pain was steady but recently did a lot of walking recently sig more pain and swelling - had aspiration last year that did help ---  11/1/22: Continues to have right knee pain as well as swelling. Also has shooting pains in the knee. No relief from HA injections, CSI and PT. Also having difficulty with commuting to work. Cannot play with his kids and pain inhibits his ADL. Worse with stairs and prolonged walking.  4/18/23: 2 weeks postop R UKA - He has pain radiating from the knee to the back as well as pain in the thigh. Denies fevers/chills. He is making progress with at home PT.  4/25/23: 3 weeks s/p R UKA - Over the past 3 days, he started to have a significant amount of pain and swelling in the knee and difficulty sleeping. Denies fevers, has chills.  5/2/32: Continued pain in R UKA says not any better- He has issues with sitting for prolonged periods of time. Asp was negative he felt better for a period - has troble staying asleep but is back to work   5/16/23: Feels worsening medial knee pain.  Took MDP which helped a little while taking meds.  Does not feel better than before surgery yet. 6/6/23: RT Knee MRI F/U. Pain is slightly better than last appointment, Pt is taken Tylenol or celebrex to help with pain. Pt has some stiffness. Overall improved from last visit. celebrex helping only one dose of oxy and no tramadol  7/11/23: 3 months s/p R UKA. Patient is still attending PT and is happy with progression and ROM but is still experiencing pain and worsens with walking. Patient has not been taking medication for pain recently due to liver enzyme concerns.   [FreeTextEntry5] : Antwan is 47-year M, still having pain.

## 2023-08-15 NOTE — ASSESSMENT
[FreeTextEntry1] : Previous doc: due to acute flare up of OA and loose body - with acute effusion -- will try aspiration and injection today hold on HA injectiondue to severe pain and swelling.  11/1/22: Medial sided OA. Pain is affecting his ADL's as well as playing with his kids, walking and stairs. He has failed conservative treatment and is indicated for UKA. Discussed this with his wife present. He is unsure of when he would like to proceed but will speak to Mirian regarding dates. Risks and benefits discussed and all questions answered.   2/7/23: Cont pain and effusion - planning for UKA in april, needs CT.  Cannot aspirate due to timing of surgery.  Recent cortisone inj 6 weeks ago.  Will cont celebrex, ice, compression sleeve. 4/18/23: S/P right knee UKA on 4/5/23. First post op. Doing well. Continue PT/HEP, ice, elevation, meds as needed.  4/25/23: 3 weeks s/p R UKA - He does have an effusion today and sig tenderness over the past 3 days. Will asp and send fluid to labs to R/O infection. XR with components well fixed.  min reddness but hypersensitivity  5/2/23: 4 weeks s/p R UKA - He continues to have an effusion and tenderness. Asp was negative, no obvious signs of infection. He will stop aspirin and start Tramadol and MDP. Will also restart PT and FU in 2 weeks.  5/16/23: IOmproving function but still complaining of medial pain, feels worse.  Effusion resolving, XR well fixed, no movement of implant seen.  Venous doppler r/o DVT, MRI eval tiba stress reaction. 6/6/23: Significant improvement. pain possibly from Tibial edema. Pt will be sent for PT and F/U in 4 weeks.  This point I think he is functioning as I would expect 9 weeks post UKA I am very happy with his progress and I feel that we are out of the woods 7/11/23: Pt is gradually improving, still experiencing some pain- will continue with PT. Will send rx for bloodwork. Follow up in 4 weeks.  I do not see any sig sign of infection but will f/u his prior blood work - aspiration was allblood - no sign of loosening on xray - still unsre of reason for sig pain but knee looks and function well -  we will cont to closly monitor   8/25/23: Pt is very active doing upwards of 8000 steps a day but is having sig episodes of sig pain, all isolated anterior medial tibia pain where I did dissection of periosteum. sig improvement of effusion. Follow up in 1 month.  Patient states that some days he walks 15,000 steps but afterwards is sore.This is expected

## 2023-08-15 NOTE — DISCUSSION/SUMMARY
[de-identified] : The patient was advised of the diagnosis.  The natural history of the pathology was explained in full to the patient in layman's terms. All questions were answered.  The risks and benefits of surgical and non-surgical treatment alternatives were explained in full to the patient. Entered by Sho Burrows acting as a scribe.

## 2023-09-02 ENCOUNTER — APPOINTMENT (OUTPATIENT)
Dept: RADIOLOGY | Facility: CLINIC | Age: 48
End: 2023-09-02
Payer: COMMERCIAL

## 2023-09-02 PROCEDURE — 72110 X-RAY EXAM L-2 SPINE 4/>VWS: CPT

## 2023-09-21 RX ORDER — SODIUM PICOSULFATE, MAGNESIUM OXIDE, AND ANHYDROUS CITRIC ACID 10; 3.5; 12 MG/160ML; G/160ML; G/160ML
10-3.5-12 MG-GM LIQUID ORAL
Qty: 1 | Refills: 0 | Status: DISCONTINUED | COMMUNITY
Start: 2023-06-07 | End: 2023-09-21

## 2023-10-23 ENCOUNTER — APPOINTMENT (OUTPATIENT)
Dept: CARDIOLOGY | Facility: CLINIC | Age: 48
End: 2023-10-23
Payer: COMMERCIAL

## 2023-10-23 ENCOUNTER — NON-APPOINTMENT (OUTPATIENT)
Age: 48
End: 2023-10-23

## 2023-10-23 VITALS
BODY MASS INDEX: 25.1 KG/M2 | WEIGHT: 147 LBS | HEART RATE: 57 BPM | SYSTOLIC BLOOD PRESSURE: 127 MMHG | HEIGHT: 64 IN | OXYGEN SATURATION: 98 % | DIASTOLIC BLOOD PRESSURE: 83 MMHG

## 2023-10-23 DIAGNOSIS — R07.89 OTHER CHEST PAIN: ICD-10-CM

## 2023-10-23 PROCEDURE — 99204 OFFICE O/P NEW MOD 45 MIN: CPT

## 2023-10-23 PROCEDURE — 93000 ELECTROCARDIOGRAM COMPLETE: CPT

## 2023-10-31 ENCOUNTER — LABORATORY RESULT (OUTPATIENT)
Age: 48
End: 2023-10-31

## 2023-10-31 ENCOUNTER — APPOINTMENT (OUTPATIENT)
Dept: HEPATOLOGY | Facility: CLINIC | Age: 48
End: 2023-10-31
Payer: COMMERCIAL

## 2023-10-31 VITALS
WEIGHT: 132 LBS | TEMPERATURE: 97.1 F | SYSTOLIC BLOOD PRESSURE: 130 MMHG | HEART RATE: 88 BPM | HEIGHT: 64 IN | DIASTOLIC BLOOD PRESSURE: 84 MMHG | OXYGEN SATURATION: 99 % | BODY MASS INDEX: 22.53 KG/M2

## 2023-10-31 PROCEDURE — 99215 OFFICE O/P EST HI 40 MIN: CPT

## 2023-11-01 ENCOUNTER — NON-APPOINTMENT (OUTPATIENT)
Age: 48
End: 2023-11-01

## 2023-11-01 LAB
A1AT SERPL-MCNC: 136 MG/DL
ALBUMIN SERPL ELPH-MCNC: 4.9 G/DL
ALP BLD-CCNC: 75 U/L
ALT SERPL-CCNC: 27 U/L
ANION GAP SERPL CALC-SCNC: 11 MMOL/L
AST SERPL-CCNC: 20 U/L
BILIRUB DIRECT SERPL-MCNC: 0.1 MG/DL
BILIRUB SERPL-MCNC: 0.3 MG/DL
BUN SERPL-MCNC: 11 MG/DL
CALCIUM SERPL-MCNC: 10.2 MG/DL
CERULOPLASMIN SERPL-MCNC: 23 MG/DL
CHLORIDE SERPL-SCNC: 101 MMOL/L
CO2 SERPL-SCNC: 28 MMOL/L
CREAT SERPL-MCNC: 0.66 MG/DL
EGFR: 114 ML/MIN/1.73M2
ESTIMATED AVERAGE GLUCOSE: 114 MG/DL
FERRITIN SERPL-MCNC: 127 NG/ML
GGT SERPL-CCNC: 23 U/L
GLUCOSE SERPL-MCNC: 90 MG/DL
HBA1C MFR BLD HPLC: 5.6 %
HCT VFR BLD CALC: 43.2 %
HGB BLD-MCNC: 14 G/DL
IGA SER QL IEP: 166 MG/DL
IGG SER QL IEP: 866 MG/DL
IGM SER QL IEP: 49 MG/DL
INR PPP: 0.97 RATIO
IRON SATN MFR SERPL: 19 %
IRON SERPL-MCNC: 65 UG/DL
MCHC RBC-ENTMCNC: 28.7 PG
MCHC RBC-ENTMCNC: 32.4 GM/DL
MCV RBC AUTO: 88.5 FL
MITOCHONDRIA AB SER IF-ACNC: NORMAL
PLATELET # BLD AUTO: 234 K/UL
POTASSIUM SERPL-SCNC: 4.2 MMOL/L
PROT SERPL-MCNC: 7 G/DL
PT BLD: 11 SEC
RBC # BLD: 4.88 M/UL
RBC # FLD: 13.1 %
SMOOTH MUSCLE AB SER QL IF: NORMAL
SODIUM SERPL-SCNC: 140 MMOL/L
TIBC SERPL-MCNC: 336 UG/DL
TSH SERPL-ACNC: 2.67 UIU/ML
UIBC SERPL-MCNC: 271 UG/DL
WBC # FLD AUTO: 8.16 K/UL

## 2023-11-01 RX ORDER — CELECOXIB 200 MG/1
200 CAPSULE ORAL TWICE DAILY
Qty: 60 | Refills: 0 | Status: DISCONTINUED | COMMUNITY
Start: 2023-05-16 | End: 2023-11-01

## 2023-11-01 RX ORDER — METHYLPREDNISOLONE 4 MG/1
4 TABLET ORAL
Qty: 1 | Refills: 0 | Status: DISCONTINUED | COMMUNITY
Start: 2022-08-16 | End: 2023-11-01

## 2023-11-01 RX ORDER — CELECOXIB 200 MG/1
200 CAPSULE ORAL
Qty: 60 | Refills: 0 | Status: DISCONTINUED | COMMUNITY
Start: 2022-08-02 | End: 2023-11-01

## 2023-11-01 RX ORDER — TRAMADOL HYDROCHLORIDE 50 MG/1
50 TABLET, COATED ORAL
Qty: 30 | Refills: 0 | Status: DISCONTINUED | COMMUNITY
Start: 2023-05-02 | End: 2023-11-01

## 2023-11-01 RX ORDER — FENOFIBRATE 145 MG/1
TABLET ORAL
Refills: 0 | Status: DISCONTINUED | COMMUNITY
End: 2023-11-01

## 2023-11-01 RX ORDER — HYALURONATE SODIUM 20 MG/2 ML
20 SYRINGE (ML) INTRAARTICULAR
Qty: 3 | Refills: 0 | Status: DISCONTINUED | COMMUNITY
Start: 2022-06-03 | End: 2023-11-01

## 2023-11-01 RX ORDER — OXYCODONE 5 MG/1
5 TABLET ORAL
Qty: 45 | Refills: 0 | Status: DISCONTINUED | COMMUNITY
Start: 2023-04-05 | End: 2023-11-01

## 2023-11-01 RX ORDER — METHYLPREDNISOLONE 4 MG/1
4 TABLET ORAL
Qty: 1 | Refills: 0 | Status: DISCONTINUED | COMMUNITY
Start: 2023-04-28 | End: 2023-11-01

## 2023-11-01 RX ORDER — CELECOXIB 200 MG/1
200 CAPSULE ORAL TWICE DAILY
Qty: 60 | Refills: 0 | Status: DISCONTINUED | COMMUNITY
Start: 2023-04-05 | End: 2023-11-01

## 2023-11-02 ENCOUNTER — NON-APPOINTMENT (OUTPATIENT)
Age: 48
End: 2023-11-02

## 2023-11-02 LAB
CELIACPAN: NORMAL
DEPRECATED KAPPA LC FREE/LAMBDA SER: 1.16 RATIO
IGA SER QL IEP: 165 MG/DL
IGG SER QL IEP: 851 MG/DL
IGM SER QL IEP: 50 MG/DL
KAPPA LC CSF-MCNC: 1.05 MG/DL
KAPPA LC SERPL-MCNC: 1.22 MG/DL

## 2023-11-03 ENCOUNTER — NON-APPOINTMENT (OUTPATIENT)
Age: 48
End: 2023-11-03

## 2023-11-03 LAB
ANA SER IF-ACNC: NEGATIVE
MITOCHONDRIAL (M2) AB, SERUM: <20 UNITS

## 2023-11-05 ENCOUNTER — NON-APPOINTMENT (OUTPATIENT)
Age: 48
End: 2023-11-05

## 2023-11-06 LAB
ANNOTATION COMMENT IMP: NOT DETECTED
HEPATITIS E QUANT BY PCR, IU/ML: NORMAL IU/ML
HEPATITIS E QUANT BY PCR, LOG IU/ML: <3.3
HEPATITIS E QUANT BY PCR, SOURCE: NORMAL
HEV AB SER QL: NEGATIVE

## 2023-11-18 ENCOUNTER — APPOINTMENT (OUTPATIENT)
Dept: ULTRASOUND IMAGING | Facility: CLINIC | Age: 48
End: 2023-11-18
Payer: COMMERCIAL

## 2023-11-18 PROCEDURE — 76700 US EXAM ABDOM COMPLETE: CPT

## 2023-11-20 ENCOUNTER — NON-APPOINTMENT (OUTPATIENT)
Age: 48
End: 2023-11-20

## 2023-12-01 ENCOUNTER — APPOINTMENT (OUTPATIENT)
Dept: CT IMAGING | Facility: CLINIC | Age: 48
End: 2023-12-01
Payer: COMMERCIAL

## 2023-12-01 ENCOUNTER — APPOINTMENT (OUTPATIENT)
Dept: HEPATOLOGY | Facility: CLINIC | Age: 48
End: 2023-12-01
Payer: COMMERCIAL

## 2023-12-01 DIAGNOSIS — R94.5 ABNORMAL RESULTS OF LIVER FUNCTION STUDIES: ICD-10-CM

## 2023-12-01 PROCEDURE — 75574 CT ANGIO HRT W/3D IMAGE: CPT

## 2023-12-01 PROCEDURE — 76981 USE PARENCHYMA: CPT

## 2023-12-04 PROBLEM — R94.5 ABNORMAL LIVER FUNCTION: Status: ACTIVE | Noted: 2023-10-31

## 2023-12-05 ENCOUNTER — APPOINTMENT (OUTPATIENT)
Dept: ORTHOPEDIC SURGERY | Facility: CLINIC | Age: 48
End: 2023-12-05
Payer: COMMERCIAL

## 2023-12-05 VITALS — BODY MASS INDEX: 22.53 KG/M2 | HEIGHT: 64 IN | WEIGHT: 132 LBS

## 2023-12-05 DIAGNOSIS — M54.31 SCIATICA, RIGHT SIDE: ICD-10-CM

## 2023-12-05 PROCEDURE — 99214 OFFICE O/P EST MOD 30 MIN: CPT

## 2023-12-05 PROCEDURE — 73562 X-RAY EXAM OF KNEE 3: CPT | Mod: LT

## 2023-12-22 ENCOUNTER — APPOINTMENT (OUTPATIENT)
Dept: MRI IMAGING | Facility: CLINIC | Age: 48
End: 2023-12-22
Payer: COMMERCIAL

## 2023-12-22 ENCOUNTER — OUTPATIENT (OUTPATIENT)
Dept: OUTPATIENT SERVICES | Facility: HOSPITAL | Age: 48
LOS: 1 days | End: 2023-12-22
Payer: COMMERCIAL

## 2023-12-22 ENCOUNTER — RESULT REVIEW (OUTPATIENT)
Age: 48
End: 2023-12-22

## 2023-12-22 DIAGNOSIS — Z96.651 PRESENCE OF RIGHT ARTIFICIAL KNEE JOINT: ICD-10-CM

## 2023-12-22 DIAGNOSIS — Z00.8 ENCOUNTER FOR OTHER GENERAL EXAMINATION: ICD-10-CM

## 2023-12-22 PROCEDURE — 72148 MRI LUMBAR SPINE W/O DYE: CPT | Mod: 26

## 2023-12-22 PROCEDURE — 73721 MRI JNT OF LWR EXTRE W/O DYE: CPT | Mod: 26,RT

## 2023-12-22 PROCEDURE — 72148 MRI LUMBAR SPINE W/O DYE: CPT

## 2023-12-22 PROCEDURE — 73721 MRI JNT OF LWR EXTRE W/O DYE: CPT

## 2024-01-02 ENCOUNTER — APPOINTMENT (OUTPATIENT)
Dept: ORTHOPEDIC SURGERY | Facility: CLINIC | Age: 49
End: 2024-01-02
Payer: COMMERCIAL

## 2024-01-02 VITALS — WEIGHT: 132 LBS | HEIGHT: 64 IN | BODY MASS INDEX: 22.53 KG/M2

## 2024-01-02 DIAGNOSIS — M47.816 SPONDYLOSIS W/OUT MYELOPATHY OR RADICULOPATHY, LUMBAR REGION: ICD-10-CM

## 2024-01-02 PROCEDURE — 99214 OFFICE O/P EST MOD 30 MIN: CPT

## 2024-01-02 NOTE — IMAGING
[de-identified] : Right knee Incision well healed Calf soft, non-tender ROM 0-142 noeffusion  Walks without assistance. anterior and medial pain  L-spine 5 out of 5 strength Sensation intact Equivocal straight leg raise Tenderness right lower back  Left knee Medial joint line tenderness No effusion Range of motion 0-1 40 Ligaments stable  XR RT KNEE showing implants fixed and in good position

## 2024-01-02 NOTE — DATA REVIEWED
[Right] : of the right [Knee] : knee [MRI] : MRI [Lumbar Spine] : lumbar spine [I reviewed the films/CD] : I reviewed the films/CD [FreeTextEntry1] : MRI RT KNEE showing UKA in good position, mild joint effusion, lateral compartment intact, mild PF OA, and minimal stress reaction- significantly improved since previous MRI.  [FreeTextEntry2] : MRI L-SPINE showing disc bulging at L2-3 and L3-4, mild foraminal stenosis at these levels

## 2024-01-02 NOTE — HISTORY OF PRESENT ILLNESS
[8] : 8 [6] : 6 [Dull/Aching] : dull/aching [de-identified] : 1/2/24: Here to review MRI L-spine and MRI RT knee.  No significant change in symptoms  Prev doc: Pt known to Dr Hathaway for rt knee OA - with MRI showing medial wear and effusion - no hx of inj in the past - pain was steady but recently did a lot of walking recently sig more pain and swelling - had aspiration last year that did help ---  11/1/22: Continues to have right knee pain as well as swelling. Also has shooting pains in the knee. No relief from HA injections, CSI and PT. Also having difficulty with commuting to work. Cannot play with his kids and pain inhibits his ADL. Worse with stairs and prolonged walking.  4/18/23: 2 weeks postop R UKA - He has pain radiating from the knee to the back as well as pain in the thigh. Denies fevers/chills. He is making progress with at home PT.  4/25/23: 3 weeks s/p R UKA - Over the past 3 days, he started to have a significant amount of pain and swelling in the knee and difficulty sleeping. Denies fevers, has chills.  5/2/32: Continued pain in R UKA says not any better- He has issues with sitting for prolonged periods of time. Asp was negative he felt better for a period - has troble staying asleep but is back to work   5/16/23: Feels worsening medial knee pain.  Took MDP which helped a little while taking meds.  Does not feel better than before surgery yet. 6/6/23: RT Knee MRI F/U. Pain is slightly better than last appointment, Pt is taken Tylenol or celebrex to help with pain. Pt has some stiffness. Overall improved from last visit. celebrex helping only one dose of oxy and no tramadol  7/11/23: 3 months s/p R UKA. Patient is still attending PT and is happy with progression and ROM but is still experiencing pain and worsens with walking. Patient has not been taking medication for pain recently due to liver enzyme concerns. 8/15/23: cont to have medial sided pain, feels that it is moving to some degree. but at times still has a lot of pain and stiffness 12/5/23: 8 months s/p R UKA. Reports pain comes and goes mostly on the medial side of the knee- no specific activity triggers it. States knee feels stiff but he is able to walk long distances - about 5 miles- but has sig pain afterwards. Not taking any meds for pain due to abnormal liver enzyme result a few months ago.  Also notes episodes of lower back buttocks pain that shoots down through his knee into his foot

## 2024-01-02 NOTE — DISCUSSION/SUMMARY
[de-identified] : The patient was advised of the diagnosis.  The natural history of the pathology was explained in full to the patient in layman's terms. All questions were answered.  The risks and benefits of surgical and non-surgical treatment alternatives were explained in full to the patient.  Entered by Sho Burrows acting as a scribe.

## 2024-01-02 NOTE — ASSESSMENT
[FreeTextEntry1] : Previous doc: due to acute flare up of OA and loose body - with acute effusion -- will try aspiration and injection today hold on HA injectiondue to severe pain and swelling. 11/1/22: Medial sided OA. Pain is affecting his ADL's as well as playing with his kids, walking and stairs. He has failed conservative treatment and is indicated for UKA. Discussed this with his wife present. He is unsure of when he would like to proceed but will speak to Mirian regarding dates. Risks and benefits discussed and all questions answered. 2/7/23: Cont pain and effusion - planning for UKA in april, needs CT. Cannot aspirate due to timing of surgery. Recent cortisone inj 6 weeks ago. Will cont celebrex, ice, compression sleeve. 4/18/23: S/P right knee UKA on 4/5/23. First post op. Doing well. Continue PT/HEP, ice, elevation, meds as needed. 4/25/23: 3 weeks s/p R UKA - He does have an effusion today and sig tenderness over the past 3 days. Will asp and send fluid to labs to R/O infection. XR with components well fixed. min reddness but hypersensitivity 5/2/23: 4 weeks s/p R UKA - He continues to have an effusion and tenderness. Asp was negative, no obvious signs of infection. He will stop aspirin and start Tramadol and MDP. Will also restart PT and FU in 2 weeks. 5/16/23: IOmproving function but still complaining of medial pain, feels worse. Effusion resolving, XR well fixed, no movement of implant seen. Venous doppler r/o DVT, MRI eval tiba stress reaction. 6/6/23: Significant improvement. pain possibly from Tibial edema. Pt will be sent for PT and F/U in 4 weeks. This point I think he is functioning as I would expect 9 weeks post UKA I am very happy with his progress and I feel that we are out of the woods 7/11/23: Pt is gradually improving, still experiencing some pain- will continue with PT. Will send rx for bloodwork. Follow up in 4 weeks. I do not see any sig sign of infection but will f/u his prior blood work - aspiration was allblood - no sign of loosening on xray - still unsre of reason for sig pain but knee looks and function well - we will cont to closly monitor 8/25/23: Pt is very active doing upwards of 8000 steps a day but is having sig episodes of sig pain, all isolated anterior medial tibia pain where I did dissection of periosteum. sig improvement of effusion. Follow up in 1 month. Patient states that some days he walks 15,000 steps but afterwards is sore. This is expected. 12/5/23: 8 months s/p R UKA. XR look good. Will obtain MRI RT knee to eval synovitis or loosening -patient had tibial edema last MRI we will see if this is resolving -  may obtain CT depending on results. Will also MRI L-spine to eval HNP having some significant pain down the leg as well as some neurologic symptoms.  I am getting an MRI f his L-spine as patient is having persistent knee pain with activity and well as shooting pain down back of leg- look for HNP. Follow up after MRI.   1/2/24: 9 months s/p R UKA. Reviewed MRI L-spine/RT knee today- findings discussed. Some lumbar dx. it does not seem the lumbar disease is causing his knee pain.  He does have some mild pain at the joint line and what looks like some medial swelling no significant effusion good range of motion and function but still has some persistent medial pain with certain activities.  Has some mild patellofemoral OA but no anterior knee symptoms and I do not believe that this is a cause of his issues.  I see no mechanical reason for his pain- at this point I am recommending he seek a second opinion. Recc PT and core strengthening to help with the back pain. F/up after second opinion and PT

## 2024-01-04 ENCOUNTER — RX RENEWAL (OUTPATIENT)
Age: 49
End: 2024-01-04

## 2024-01-16 ENCOUNTER — APPOINTMENT (OUTPATIENT)
Dept: HEPATOLOGY | Facility: CLINIC | Age: 49
End: 2024-01-16
Payer: COMMERCIAL

## 2024-01-16 VITALS
BODY MASS INDEX: 22.71 KG/M2 | OXYGEN SATURATION: 99 % | TEMPERATURE: 97.1 F | HEIGHT: 64 IN | WEIGHT: 133 LBS | HEART RATE: 73 BPM | SYSTOLIC BLOOD PRESSURE: 120 MMHG | DIASTOLIC BLOOD PRESSURE: 78 MMHG

## 2024-01-16 DIAGNOSIS — K76.0 FATTY (CHANGE OF) LIVER, NOT ELSEWHERE CLASSIFIED: ICD-10-CM

## 2024-01-16 PROCEDURE — 99214 OFFICE O/P EST MOD 30 MIN: CPT

## 2024-01-16 NOTE — HISTORY OF PRESENT ILLNESS
[de-identified] : 47 y/o male with a PMH of HLD, Osteoarthritis of knee, GERD, pre-T2DM, and abnormal liver tests, here to establish care after referral from Dr. Lyons. He's here for f/u after w/u for CLD - labs, fibroscan and US.  US - unremarkable but to my eyes has some hepatic steatosis Fibrscan showed F0S1 Labs - unremarkable pt denies any sx; no etoh; no new meds  Previous note-  Pt made aware of abnormal liver tests in 4/2023 -- pt was maintained on long-term pain meds after partial knee replacement and routine labs noted elevated LFTs (AST//118). He was then advised to hold Celebrex, fenofibrate, Crestor, Oxycodone and Tylenol -- was not taking excessive amounts and followed dosing instructions. After holding these medications LFTs downtrended (AST/ALT 41/41) and statin was restarted. Most recent labs on 10/23/23 noted further improvement in liver tests (AST/ALT 23/21) and ALP despite restarting Crestor. Most recent use Methylprednisolone after knee surgery to aid with weight loss. Recent use of Clindamycin. ETOH use 3-4 x month with ~ 3 drinks -- prior moderate ETOH use. Prior cigarette smoker. No IVDU. No blood transfusions. No tattoos. Prior weight ~152 lbs and intentionally lost weight given c/f fatty liver > down to 132 lbs. Born in [place] and in [place] years. Work in IT. Lives at home with wife. Independent in ADLs/iADLs. OTC use of MVI.

## 2024-01-16 NOTE — ASSESSMENT
[FreeTextEntry1] : 47 y/o male with a PMH of HLD, Osteoarthritis of knee, GERD, pre-T2DM, and abnormal liver tests, here to establish care after referral from Dr. Lyons. He's here for f/u. Imaging and labs reviewed. Following issues were d/w pt in detail.   Has metabolic risk factors- likely lean MASLD with normal BMI, now with normal liver tests and unremarkable chronic liver dz cordon;  will get MRI with PDFF to accurately quantify hepatic steatosis continue to avoid etoh for now initiate CV exercise to help with metabolism, although pt is quite active f/u based on MRI findings I spent total of 30 minutes on this patient encounter.

## 2024-01-23 ENCOUNTER — NON-APPOINTMENT (OUTPATIENT)
Age: 49
End: 2024-01-23

## 2024-01-23 ENCOUNTER — APPOINTMENT (OUTPATIENT)
Dept: CARDIOLOGY | Facility: CLINIC | Age: 49
End: 2024-01-23
Payer: COMMERCIAL

## 2024-01-23 VITALS
HEART RATE: 65 BPM | OXYGEN SATURATION: 94 % | DIASTOLIC BLOOD PRESSURE: 84 MMHG | HEIGHT: 64 IN | BODY MASS INDEX: 22.71 KG/M2 | SYSTOLIC BLOOD PRESSURE: 131 MMHG | WEIGHT: 133 LBS

## 2024-01-23 DIAGNOSIS — R73.01 IMPAIRED FASTING GLUCOSE: ICD-10-CM

## 2024-01-23 PROCEDURE — 99214 OFFICE O/P EST MOD 30 MIN: CPT

## 2024-01-23 PROCEDURE — 93000 ELECTROCARDIOGRAM COMPLETE: CPT

## 2024-01-23 NOTE — ASSESSMENT
[FreeTextEntry1] : 1. Check labs at [facility], consider de-escalation of Crestor. Continue 10 mg for now. 2. Elevated Hgb A1C. Last was 5.6%-5.7%. We reviewed carbohydrate restriction in detail. Check A1C

## 2024-01-23 NOTE — REASON FOR VISIT
[Symptom and Test Evaluation] : symptom and test evaluation [Hyperlipidemia] : hyperlipidemia [FreeTextEntry1] : CTA negative for CAD, CACS 0 Being careful with diet, not exercising much but does do 10k steps daily  1. Check labs at [facility], consider de-escalation of Crestor. Continue 10 mg for now. 2. Elevated Hgb A1C. Last was 5.6%-5.7%. We reviewed carbohydrate restriction in detail. Check A1C

## 2024-01-24 ENCOUNTER — TRANSCRIPTION ENCOUNTER (OUTPATIENT)
Age: 49
End: 2024-01-24

## 2024-01-25 ENCOUNTER — NON-APPOINTMENT (OUTPATIENT)
Age: 49
End: 2024-01-25

## 2024-01-25 DIAGNOSIS — Z87.891 PERSONAL HISTORY OF NICOTINE DEPENDENCE: ICD-10-CM

## 2024-01-25 DIAGNOSIS — D50.9 IRON DEFICIENCY ANEMIA, UNSPECIFIED: ICD-10-CM

## 2024-01-25 DIAGNOSIS — Z78.9 OTHER SPECIFIED HEALTH STATUS: ICD-10-CM

## 2024-01-25 DIAGNOSIS — F40.248 OTHER SITUATIONAL TYPE PHOBIA: ICD-10-CM

## 2024-01-25 RX ORDER — ACETAMINOPHEN 500 MG/1
500 TABLET ORAL
Refills: 0 | Status: ACTIVE | COMMUNITY

## 2024-01-29 LAB
ALBUMIN SERPL ELPH-MCNC: 4.7 G/DL
ALP BLD-CCNC: 65 U/L
ALT SERPL-CCNC: 29 U/L
ANION GAP SERPL CALC-SCNC: 12 MMOL/L
AST SERPL-CCNC: 22 U/L
BILIRUB SERPL-MCNC: 0.3 MG/DL
BUN SERPL-MCNC: 10 MG/DL
CALCIUM SERPL-MCNC: 9.6 MG/DL
CHLORIDE SERPL-SCNC: 105 MMOL/L
CHOLEST SERPL-MCNC: 168 MG/DL
CO2 SERPL-SCNC: 25 MMOL/L
CREAT SERPL-MCNC: 0.72 MG/DL
CRP SERPL HS-MCNC: 0.51 MG/L
EGFR: 111 ML/MIN/1.73M2
ESTIMATED AVERAGE GLUCOSE: 108 MG/DL
GLUCOSE SERPL-MCNC: 95 MG/DL
HBA1C MFR BLD HPLC: 5.4 %
HDLC SERPL-MCNC: 46 MG/DL
LDLC SERPL CALC-MCNC: 93 MG/DL
NONHDLC SERPL-MCNC: 122 MG/DL
POTASSIUM SERPL-SCNC: 4.7 MMOL/L
PROT SERPL-MCNC: 6.7 G/DL
SODIUM SERPL-SCNC: 141 MMOL/L
TRIGL SERPL-MCNC: 165 MG/DL

## 2024-01-29 RX ORDER — ROSUVASTATIN CALCIUM 5 MG/1
5 TABLET, FILM COATED ORAL
Qty: 90 | Refills: 3 | Status: ACTIVE | COMMUNITY
Start: 1900-01-01 | End: 1900-01-01

## 2024-02-02 ENCOUNTER — TRANSCRIPTION ENCOUNTER (OUTPATIENT)
Age: 49
End: 2024-02-02

## 2024-02-05 ENCOUNTER — APPOINTMENT (OUTPATIENT)
Dept: ORTHOPEDIC SURGERY | Facility: CLINIC | Age: 49
End: 2024-02-05
Payer: COMMERCIAL

## 2024-02-05 VITALS
SYSTOLIC BLOOD PRESSURE: 133 MMHG | HEART RATE: 96 BPM | TEMPERATURE: 97.9 F | HEIGHT: 64 IN | WEIGHT: 133 LBS | BODY MASS INDEX: 22.71 KG/M2 | DIASTOLIC BLOOD PRESSURE: 83 MMHG

## 2024-02-05 DIAGNOSIS — M17.11 UNILATERAL PRIMARY OSTEOARTHRITIS, RIGHT KNEE: ICD-10-CM

## 2024-02-05 DIAGNOSIS — M25.561 PAIN IN RIGHT KNEE: ICD-10-CM

## 2024-02-05 PROCEDURE — 99204 OFFICE O/P NEW MOD 45 MIN: CPT

## 2024-02-05 PROCEDURE — 73562 X-RAY EXAM OF KNEE 3: CPT | Mod: RT

## 2024-02-05 NOTE — HISTORY OF PRESENT ILLNESS
[Stable] : stable [3] : a current pain level of 3/10 [1] : a minimum pain level of 1/10 [6] : a maximum pain level of 6/10 [de-identified] : Antwan is a 48-year-old male does present today for second opinion of right knee pain he is status post right knee medial uni-compartment arthroplasty performed by Dr. Douglas on 4/5/2023.  Overall, he states that his discomfort and function is improved as compared to prior to the surgery.  However, he does report intermittent, mild, sometimes moderate dull aching pain localized to the medial aspect of the knee.  Sometimes with activity, sometimes following activity or at rest.  He does note some general eye stiffness.  No catching, locking or buckling.  Currently denies any groin pain.  He does report occasional low back discomfort but denies any radiating pain, paresthesia or focal weakness of the right lower extremity.  He was sent for postoperative MRI of the right knee which she did have completed back in December 2023.  A second opinion for another joint replacement specialist was recommended by Dr. Douglas at that time.

## 2024-02-05 NOTE — DISCUSSION/SUMMARY
[Medication Risks Reviewed] : Medication risks reviewed [de-identified] : 49 y/o M pt presents s/p right UKA implants in good positioning with no evidence of wear, loosening, or subsidence. faint radiolucency line at the bone cement interface, however, no obvious sign of implant loosening. The nature of his condition and treatment options were discussed in detail. We reviewed the pt's MRI which shows a medial compartment hemiarthroplasty with possible mild stress reaction adjacent to the tibial component. However, the pt shows no indication for a revision right UKA. We discussed a possible CT scan to rule out any implant loosening. We sent a script for a CT scan. If the pt notes worsening pain, the pt may pursue a CT scan. He should continue to do low impact exercises. The pt may take OTC anti-inflammatories as needed. We sent a script for a Medrol dose pack. The pt will f/u after the CT scan.

## 2024-02-05 NOTE — PHYSICAL EXAM
[de-identified] : GENERAL APPEARANCE: Well nourished and hydrated, pleasant, alert, and oriented x 3. Appears their stated age.  HEENT: Normocephalic, extraocular eye motion intact. Nasal septum midline. Oral cavity clear. External auditory canal clear.  RESPIRATORY: Breath sounds clear and audible in all lobes. No wheezing, No accessory muscle use. CARDIOVASCULAR: No apparent abnormalities. No lower leg edema. No varicosities. Pedal pulses are palpable. NEUROLOGIC: Sensation is normal, no muscle weakness in the upper or lower extremities. DERMATOLOGIC: No apparent skin lesions, moist, warm, no rash. SPINE: Cervical spine appears normal and moves freely; thoracic spine appears normal and moves freely; lumbosacral spine appears normal and moves freely, normal, nontender. MUSCULOSKELETAL: Hands, wrists, and elbows are normal and move freely, shoulders are normal and move freely.  [de-identified] : Right knee exam shows healed incision with no sign of infection. ROM 0-120 [de-identified] : 3V xray of the right knee done in office today and reviewed by Dr. Rakesh Pendleton demonstrates s/p right UKA implants in good positioning with no evidence of wear, loosening, or subsidence. faint radiolucency line at the bone cement interface, however, no obvious sign of implant loosening.   MRI of the right knee done at [facility] on Dec 22nd, 2023 shows:  *  Medial compartment hemiarthroplasty with possible mild stress reaction adjacent to the tibial component. No osteolysis. *  Trace joint effusion. *  No lateral meniscal tear. *  Mild patellofemoral arthrosis.

## 2024-02-16 ENCOUNTER — APPOINTMENT (OUTPATIENT)
Dept: INTERNAL MEDICINE | Facility: CLINIC | Age: 49
End: 2024-02-16
Payer: COMMERCIAL

## 2024-02-16 VITALS
BODY MASS INDEX: 22.88 KG/M2 | WEIGHT: 134 LBS | HEART RATE: 74 BPM | OXYGEN SATURATION: 99 % | DIASTOLIC BLOOD PRESSURE: 89 MMHG | HEIGHT: 64 IN | SYSTOLIC BLOOD PRESSURE: 144 MMHG

## 2024-02-16 LAB
BILIRUB UR QL STRIP: NEGATIVE
CLARITY UR: CLEAR
COLLECTION METHOD: NORMAL
GLUCOSE UR-MCNC: NEGATIVE
HCG UR QL: 0.2 EU/DL
HGB UR QL STRIP.AUTO: NEGATIVE
KETONES UR-MCNC: NEGATIVE
LEUKOCYTE ESTERASE UR QL STRIP: NEGATIVE
NITRITE UR QL STRIP: NEGATIVE
PH UR STRIP: 8
PROT UR STRIP-MCNC: NEGATIVE
SP GR UR STRIP: 1.01

## 2024-02-16 PROCEDURE — 99213 OFFICE O/P EST LOW 20 MIN: CPT

## 2024-02-16 PROCEDURE — 81003 URINALYSIS AUTO W/O SCOPE: CPT | Mod: QW

## 2024-02-16 RX ORDER — BENZONATATE 100 MG/1
100 CAPSULE ORAL EVERY 8 HOURS
Refills: 0 | Status: DISCONTINUED | COMMUNITY
End: 2024-02-16

## 2024-02-16 RX ORDER — DOXYCYCLINE 100 MG/1
100 TABLET, FILM COATED ORAL
Refills: 0 | Status: DISCONTINUED | COMMUNITY
End: 2024-02-16

## 2024-02-16 RX ORDER — NIRMATRELVIR AND RITONAVIR 300-100 MG
20 X 150 MG & KIT ORAL
Refills: 0 | Status: DISCONTINUED | COMMUNITY
End: 2024-02-16

## 2024-02-16 RX ORDER — FENOFIBRATE 160 MG/1
160 TABLET ORAL DAILY
Refills: 0 | Status: DISCONTINUED | COMMUNITY
End: 2024-02-16

## 2024-02-16 RX ORDER — AZITHROMYCIN DIHYDRATE 250 MG/1
250 TABLET, FILM COATED ORAL DAILY
Refills: 0 | Status: DISCONTINUED | COMMUNITY
End: 2024-02-16

## 2024-02-16 RX ORDER — PANTOPRAZOLE 40 MG/1
40 TABLET, DELAYED RELEASE ORAL DAILY
Qty: 30 | Refills: 0 | Status: DISCONTINUED | COMMUNITY
Start: 2023-04-05 | End: 2024-02-16

## 2024-02-16 NOTE — REVIEW OF SYSTEMS
[Joint Pain] : joint pain [Back Pain] : back pain [Negative] : Heme/Lymph [FreeTextEntry9] : sees ortho for back pain and knee pain

## 2024-02-16 NOTE — PLAN
[FreeTextEntry1] : 48-year-old male here for acute visit Reports worsening suprapubic pressure, suspect mild urinary retention, UA today is negative for infection.  Will send off for urine culture.  Will refer to urology Prior PSA was 2.7 more than a year ago,  we will recheck

## 2024-02-16 NOTE — HISTORY OF PRESENT ILLNESS
[FreeTextEntry8] : 48M, here for an acute visit.  pt reports 2-3 days of suprapubic pressure and increase urgency. no burning or hematuria. no fever chills.  Pt's prior PSA was 12/2022 was 2.7.  Pt reports his chronic back pain has not changed.

## 2024-02-16 NOTE — PHYSICAL EXAM
[Soft] : abdomen soft [Normal] : affect was normal and insight and judgment were intact [de-identified] : reproduction of suprapubic pressure with pressure applied to bladder

## 2024-03-02 ENCOUNTER — LABORATORY RESULT (OUTPATIENT)
Age: 49
End: 2024-03-02

## 2024-03-04 ENCOUNTER — APPOINTMENT (OUTPATIENT)
Dept: UROLOGY | Facility: CLINIC | Age: 49
End: 2024-03-04
Payer: COMMERCIAL

## 2024-03-04 VITALS
SYSTOLIC BLOOD PRESSURE: 127 MMHG | DIASTOLIC BLOOD PRESSURE: 85 MMHG | HEART RATE: 61 BPM | TEMPERATURE: 97.9 F | OXYGEN SATURATION: 99 % | RESPIRATION RATE: 16 BRPM

## 2024-03-04 DIAGNOSIS — E78.5 HYPERLIPIDEMIA, UNSPECIFIED: ICD-10-CM

## 2024-03-04 DIAGNOSIS — R10.9 UNSPECIFIED ABDOMINAL PAIN: ICD-10-CM

## 2024-03-04 PROCEDURE — 99204 OFFICE O/P NEW MOD 45 MIN: CPT

## 2024-03-04 NOTE — PHYSICAL EXAM
[Normal Appearance] : normal appearance [Well Groomed] : well groomed [General Appearance - In No Acute Distress] : no acute distress [Edema] : no peripheral edema [Exaggerated Use Of Accessory Muscles For Inspiration] : no accessory muscle use [Abdomen Soft] : soft [Respiration, Rhythm And Depth] : normal respiratory rhythm and effort [Costovertebral Angle Tenderness] : no ~M costovertebral angle tenderness [Abdomen Tenderness] : non-tender [] : no rash [Normal Station and Gait] : the gait and station were normal for the patient's age [Oriented To Time, Place, And Person] : oriented to person, place, and time [No Focal Deficits] : no focal deficits [No Palpable Adenopathy] : no palpable adenopathy [Affect] : the affect was normal [Mood] : the mood was normal [de-identified] : pvr- 83 ml

## 2024-03-04 NOTE — HISTORY OF PRESENT ILLNESS
[FreeTextEntry1] : patient here for eval of intermittent voids freqnec related to fluids , water, coffee , urgency  nocturia 1-2 x , denies holding urine, denies stran to void some INC after void - ( patient admits to being uncirc) " main reason I am here is for lower abdominal 'stimulation ' for the past few months - occasioanal and lasts for sev hours , resolves on its own"  and pressure to void ,  unsure if empty after void  no hematuria , sl irritation wiht void , good flow  no hx of utis  bowel habits good  sexually active with no above pain  currently wiht lower abd discomfort psa 2.65 ( march 2024) urine dip and cx : negative  Nov 2023 ( abd sono ): kidneys normal  here for further eval

## 2024-03-04 NOTE — ASSESSMENT
[FreeTextEntry1] : incomplete emptying and lower abd pressure  BPH  1- check bladder sono for prostate size 2- Check urine  3- Flomax trial - risks and benefits discussed  4- rtc 4-6 weeks to eval luts and abd pressure

## 2024-03-05 ENCOUNTER — APPOINTMENT (OUTPATIENT)
Dept: ORTHOPEDIC SURGERY | Facility: CLINIC | Age: 49
End: 2024-03-05

## 2024-03-06 ENCOUNTER — TRANSCRIPTION ENCOUNTER (OUTPATIENT)
Age: 49
End: 2024-03-06

## 2024-03-07 ENCOUNTER — APPOINTMENT (OUTPATIENT)
Dept: ULTRASOUND IMAGING | Facility: CLINIC | Age: 49
End: 2024-03-07
Payer: COMMERCIAL

## 2024-03-07 PROCEDURE — 76857 US EXAM PELVIC LIMITED: CPT

## 2024-03-22 ENCOUNTER — APPOINTMENT (OUTPATIENT)
Dept: UROLOGY | Facility: CLINIC | Age: 49
End: 2024-03-22
Payer: COMMERCIAL

## 2024-03-22 VITALS
HEART RATE: 64 BPM | DIASTOLIC BLOOD PRESSURE: 82 MMHG | TEMPERATURE: 97.6 F | SYSTOLIC BLOOD PRESSURE: 146 MMHG | OXYGEN SATURATION: 97 %

## 2024-03-22 DIAGNOSIS — R35.0 FREQUENCY OF MICTURITION: ICD-10-CM

## 2024-03-22 DIAGNOSIS — R14.0 ABDOMINAL DISTENSION (GASEOUS): ICD-10-CM

## 2024-03-22 PROCEDURE — 99214 OFFICE O/P EST MOD 30 MIN: CPT

## 2024-03-22 NOTE — PHYSICAL EXAM
[Normal Appearance] : normal appearance [Well Groomed] : well groomed [General Appearance - In No Acute Distress] : no acute distress [Edema] : no peripheral edema [Respiration, Rhythm And Depth] : normal respiratory rhythm and effort [Exaggerated Use Of Accessory Muscles For Inspiration] : no accessory muscle use [Costovertebral Angle Tenderness] : no ~M costovertebral angle tenderness [Abdomen Tenderness] : non-tender [Abdomen Soft] : soft [Penis Abnormality] : normal uncircumcised penis [Urethral Meatus] : meatus normal [Epididymis] : the epididymides were normal [Urinary Bladder Findings] : the bladder was normal on palpation [Testes Tenderness] : no tenderness of the testes [Testes Mass (___cm)] : there were no testicular masses [Prostate Tenderness] : the prostate was not tender [No Prostate Nodules] : no prostate nodules [Prostate Size ___ gm] : prostate size [unfilled] gm [Normal Station and Gait] : the gait and station were normal for the patient's age [] : no rash [No Focal Deficits] : no focal deficits [Oriented To Time, Place, And Person] : oriented to person, place, and time [Affect] : the affect was normal [Mood] : the mood was normal [No Palpable Adenopathy] : no palpable adenopathy

## 2024-03-25 LAB
APPEARANCE: ABNORMAL
BACTERIA UR CULT: NORMAL
BACTERIA: NEGATIVE /HPF
BILIRUBIN URINE: NEGATIVE
BLOOD URINE: NEGATIVE
CAST: 0 /LPF
COLOR: YELLOW
EPITHELIAL CELLS: 0 /HPF
GLUCOSE QUALITATIVE U: NEGATIVE MG/DL
KETONES URINE: NEGATIVE MG/DL
LEUKOCYTE ESTERASE URINE: NEGATIVE
MICROSCOPIC-UA: NORMAL
NITRITE URINE: NEGATIVE
PH URINE: 8
PROTEIN URINE: NEGATIVE MG/DL
RED BLOOD CELLS URINE: 0 /HPF
SPECIFIC GRAVITY URINE: 1.01
UROBILINOGEN URINE: 0.2 MG/DL
WHITE BLOOD CELLS URINE: 0 /HPF

## 2024-03-30 PROBLEM — R14.0 ABDOMINAL BLOATING: Status: ACTIVE | Noted: 2020-01-28

## 2024-03-30 PROBLEM — R35.0 FREQUENT URINATION: Status: ACTIVE | Noted: 2024-03-04

## 2024-03-30 NOTE — REASON FOR VISIT
[Yes] : Reviewed medication list for presence of high-risk medications. [Patient/caregiver able to verbalize understanding of medications, including indications and side effects] : Patient/caregiver able to verbalize understanding of medications, including indications and side effects [] : does not miss any medication doses [Follow-up Visit ___] : a follow-up visit  for [unfilled] [Spouse] : spouse

## 2024-03-30 NOTE — HISTORY OF PRESENT ILLNESS
[FreeTextEntry1] : 49 yo M presents with wife (Dr. Alessia Nolan on the phone) Presents with 2 month history of pelvic bloating and pressure Unsure if related to urination - doesn't go away with urination Usually occurs during the morning and can last a few hours Doesn't occur everyday Also with LUTS for a year - Voiding every hour, nocturia rarely No weak flow, no straining Drinks 3-4 glasses of water, 30 ounces of decafe coffee, 1 cup of black tea daily Some post void dribbling Also complaining of 4-5 month history of "feels like something is moving in the tubes and somewhat stimulating - not arousal" in the genital area Initially saw Dr. Bliss a few weeks ago and was prescribed tamsulosin but pt never started it normal bowel movements no ED issues, no hematospermia

## 2024-03-30 NOTE — ASSESSMENT
[FreeTextEntry1] : 49 yo M with pelvic pressure, LUTS, post void dribbling  - PVR minimal - UA, culture - Reviewed previous records from Dr. Bliss - Discussed possible etiologies for LUTS. Discussed ways to manage them including behavioral modifications such as adequate hydration, controlling constipation, restricting fluids in the evening. Reviewed pelvic PT option as well. - Discussed pros and cons of taking the tamsulosin - Reviewed potential etiologies for pelvic pressure including  vs. non- and ways to manage symptoms - Reassured pt regarding post void dribbling - FU in 2 months

## 2024-04-08 ENCOUNTER — APPOINTMENT (OUTPATIENT)
Dept: UROLOGY | Facility: CLINIC | Age: 49
End: 2024-04-08

## 2024-05-01 NOTE — ED ADULT NURSE NOTE - NS PRO AD PATIENT TYPE
[facility]  [address] [address]  [phone]      Kb Webb :2019 MRN:[id number]    2024 Time Session Began: 830 Time Session Ended: 915    Session Type: Therapy 38-52 minutes (90215)    Others Present: mother    Intervention:  Cognitive Behavioral, Supportive    Suicide/Homicide/Violence Ideation:  Denied    If Yes, explain: n/a    Current Outpatient Medications   Medication Sig    guanFACINE (Intuniv) 2 MG TABLET SR 24 HR Take 1 tablet by mouth daily.     No current facility-administered medications for this visit.       Change in Medication(s) Reported: No    If Yes, explain: Patient saw Dr. Galeas on 3/14/2024 and no changes were made    Patient/Family Education Provided: Yes  Patient/Family Displays Understanding: Yes    If No, explain: n/a    Chief complaint in patient's own words: \"behavior.\"    Progress Note containing chief complaint and symptoms/problems related to the complaint:    D: Patient's mother said patient has been doing really well.  She is taking a break 1 time per day at school, sometimes  times per day.  This typically involves Kacey letting the teacher know she needs a break and will then go to the office to call her mother.  She has not been leaving school due to this.  The one day she did leave early, mother encouraged her to stay a few more hours and picked her up closer to the end of the school day.  Patient is in softball and is looking forward to end of the year activities.  She is happy about getting pink glasses for her Astigmatism.      A: Provided support to patient.  Identified thoughts and feelings/body reactions when needing to take a break at school.  Patient said \"my body gets bubbly.\"  History of triggers were being told no, not understanding something, not feeling understood or believed by others.  Discussed coping skills for frustration.    R: Patient presented  with a pleasant mood.  Affect consistent to content.  Calm, appropriate behavior.  No safety concerns noted.     P: Patient to utilize skills discussed in session(s) as well as outside resources and supports.  Patient to follow up with PCP and Dr. Galeas.  Is on wait list for neuropsychology evaluation with Dr. Gutierrez.        Need for Community Resources Assessed: Yes    Resources Needed: No    If Yes, what resources: n/a    Primary Diagnosis: Disruptive behavior disorder : N/A  Rule out attention deficit hyperactivity disorder, anxiety disorder, mood disorder, learning disorder    Treatment Plan:  unchanged    Discharge Plan: N/A     Next Appointment: Patient to return about 4-6 weeks into the 1390-9336 school year, before October.  Call sooner if needed.   Nisa Oh, LPC         Health Care Proxy (HCP)

## 2024-05-28 ENCOUNTER — RX RENEWAL (OUTPATIENT)
Age: 49
End: 2024-05-28

## 2024-05-28 RX ORDER — TAMSULOSIN HYDROCHLORIDE 0.4 MG/1
0.4 CAPSULE ORAL
Qty: 90 | Refills: 3 | Status: ACTIVE | COMMUNITY
Start: 2024-03-04 | End: 1900-01-01

## 2024-05-31 ENCOUNTER — APPOINTMENT (OUTPATIENT)
Dept: CT IMAGING | Facility: CLINIC | Age: 49
End: 2024-05-31
Payer: COMMERCIAL

## 2024-05-31 ENCOUNTER — APPOINTMENT (OUTPATIENT)
Age: 49
End: 2024-05-31
Payer: COMMERCIAL

## 2024-05-31 ENCOUNTER — RESULT REVIEW (OUTPATIENT)
Age: 49
End: 2024-05-31

## 2024-05-31 ENCOUNTER — OUTPATIENT (OUTPATIENT)
Dept: OUTPATIENT SERVICES | Facility: HOSPITAL | Age: 49
LOS: 1 days | End: 2024-05-31
Payer: COMMERCIAL

## 2024-05-31 DIAGNOSIS — R10.2 PELVIC AND PERINEAL PAIN: ICD-10-CM

## 2024-05-31 DIAGNOSIS — M17.11 UNILATERAL PRIMARY OSTEOARTHRITIS, RIGHT KNEE: ICD-10-CM

## 2024-05-31 DIAGNOSIS — R39.15 URGENCY OF URINATION: ICD-10-CM

## 2024-05-31 DIAGNOSIS — N39.43 POST-VOID DRIBBLING: ICD-10-CM

## 2024-05-31 PROCEDURE — 99213 OFFICE O/P EST LOW 20 MIN: CPT

## 2024-05-31 PROCEDURE — 73700 CT LOWER EXTREMITY W/O DYE: CPT | Mod: 26,RT

## 2024-05-31 PROCEDURE — 76376 3D RENDER W/INTRP POSTPROCES: CPT | Mod: 26

## 2024-05-31 PROCEDURE — 76376 3D RENDER W/INTRP POSTPROCES: CPT

## 2024-05-31 PROCEDURE — 73700 CT LOWER EXTREMITY W/O DYE: CPT

## 2024-06-02 LAB
ALBUMIN SERPL ELPH-MCNC: 4.6 G/DL
ALP BLD-CCNC: 66 U/L
ALT SERPL-CCNC: 31 U/L
ANION GAP SERPL CALC-SCNC: 10 MMOL/L
AST SERPL-CCNC: 22 U/L
BILIRUB SERPL-MCNC: 0.4 MG/DL
BUN SERPL-MCNC: 12 MG/DL
CALCIUM SERPL-MCNC: 9.7 MG/DL
CHLORIDE SERPL-SCNC: 103 MMOL/L
CHOLEST SERPL-MCNC: 194 MG/DL
CO2 SERPL-SCNC: 26 MMOL/L
CREAT SERPL-MCNC: 0.75 MG/DL
EGFR: 110 ML/MIN/1.73M2
GLUCOSE SERPL-MCNC: 102 MG/DL
HDLC SERPL-MCNC: 43 MG/DL
LDLC SERPL CALC-MCNC: 127 MG/DL
NONHDLC SERPL-MCNC: 150 MG/DL
POTASSIUM SERPL-SCNC: 4.7 MMOL/L
PROT SERPL-MCNC: 6.9 G/DL
SODIUM SERPL-SCNC: 139 MMOL/L
TRIGL SERPL-MCNC: 128 MG/DL

## 2024-06-05 ENCOUNTER — NON-APPOINTMENT (OUTPATIENT)
Age: 49
End: 2024-06-05

## 2024-06-05 ENCOUNTER — APPOINTMENT (OUTPATIENT)
Dept: ORTHOPEDIC SURGERY | Facility: CLINIC | Age: 49
End: 2024-06-05
Payer: COMMERCIAL

## 2024-06-05 PROCEDURE — 99442: CPT

## 2024-06-09 PROBLEM — N39.43 URINARY DRIBBLING: Status: ACTIVE | Noted: 2024-03-04

## 2024-06-09 PROBLEM — R39.15 URINARY URGENCY: Status: ACTIVE | Noted: 2024-03-04

## 2024-06-09 PROBLEM — R10.2 SUPRAPUBIC PRESSURE: Status: ACTIVE | Noted: 2024-02-16

## 2024-06-09 NOTE — HISTORY OF PRESENT ILLNESS
[FreeTextEntry1] : 47 yo M presents with wife (Dr. Alessia Nolan on the phone) Presents with 2 month history of pelvic bloating and pressure Unsure if related to urination - doesn't go away with urination Usually occurs during the morning and can last a few hours Doesn't occur everyday Also with LUTS for a year - Voiding every hour, nocturia rarely No weak flow, no straining Drinks 3-4 glasses of water, 30 ounces of decafe coffee, 1 cup of black tea daily Some post void dribbling Also complaining of 4-5 month history of "feels like something is moving in the tubes and somewhat stimulating - not arousal" in the genital area Initially saw Dr. Bliss a few weeks ago and was prescribed tamsulosin but pt never started it normal bowel movements no ED issues, no hematospermia  5/31 Interval history: Has been taking tamsulosin consistently Noticed improvement in pelvic pressure but still having genital "tingling session" Also with bothersome post void dribbling Voiding every hour, no nocturia, associated with urgency which hasn't improved

## 2024-06-09 NOTE — PHYSICAL EXAM
[Normal Appearance] : normal appearance [Well Groomed] : well groomed [General Appearance - In No Acute Distress] : no acute distress [Edema] : no peripheral edema [Respiration, Rhythm And Depth] : normal respiratory rhythm and effort [Exaggerated Use Of Accessory Muscles For Inspiration] : no accessory muscle use [Abdomen Soft] : soft [Abdomen Tenderness] : non-tender [Costovertebral Angle Tenderness] : no ~M costovertebral angle tenderness [Urethral Meatus] : meatus normal [Penis Abnormality] : normal uncircumcised penis [Urinary Bladder Findings] : the bladder was normal on palpation [Epididymis] : the epididymides were normal [Testes Tenderness] : no tenderness of the testes [Testes Mass (___cm)] : there were no testicular masses [Prostate Tenderness] : the prostate was not tender [No Prostate Nodules] : no prostate nodules [Prostate Size ___ gm] : prostate size [unfilled] gm [Normal Station and Gait] : the gait and station were normal for the patient's age [] : no rash [No Focal Deficits] : no focal deficits [Oriented To Time, Place, And Person] : oriented to person, place, and time [Affect] : the affect was normal [Mood] : the mood was normal [No Palpable Adenopathy] : no palpable adenopathy [de-identified] : deferred ARACELI

## 2024-06-09 NOTE — ASSESSMENT
[FreeTextEntry1] : 47 yo M with pelvic pressure, LUTS, post void dribbling  - Again reviewed options for his LUTS including pelvic PT and behavioral modifications. Pt interested in second and even thirdline options. Jorje refer to Dr. Fisher - Again reassured pt regarding post void dribbling - Continue tamsulosin for now

## 2024-06-14 ENCOUNTER — APPOINTMENT (OUTPATIENT)
Dept: UROLOGY | Facility: CLINIC | Age: 49
End: 2024-06-14

## 2024-06-20 ENCOUNTER — NON-APPOINTMENT (OUTPATIENT)
Age: 49
End: 2024-06-20

## 2024-06-21 ENCOUNTER — APPOINTMENT (OUTPATIENT)
Dept: ORTHOPEDIC SURGERY | Facility: CLINIC | Age: 49
End: 2024-06-21
Payer: COMMERCIAL

## 2024-06-21 ENCOUNTER — APPOINTMENT (OUTPATIENT)
Dept: INTERNAL MEDICINE | Facility: CLINIC | Age: 49
End: 2024-06-21
Payer: COMMERCIAL

## 2024-06-21 VITALS
DIASTOLIC BLOOD PRESSURE: 83 MMHG | HEIGHT: 64 IN | BODY MASS INDEX: 24.07 KG/M2 | SYSTOLIC BLOOD PRESSURE: 138 MMHG | WEIGHT: 141 LBS | OXYGEN SATURATION: 98 % | HEART RATE: 63 BPM

## 2024-06-21 VITALS — HEIGHT: 64 IN | WEIGHT: 134 LBS | BODY MASS INDEX: 22.88 KG/M2

## 2024-06-21 DIAGNOSIS — Z96.651 PRESENCE OF RIGHT ARTIFICIAL KNEE JOINT: ICD-10-CM

## 2024-06-21 DIAGNOSIS — H61.20 IMPACTED CERUMEN, UNSPECIFIED EAR: ICD-10-CM

## 2024-06-21 DIAGNOSIS — K22.70 BARRETT'S ESOPHAGUS W/OUT DYSPLASIA: ICD-10-CM

## 2024-06-21 DIAGNOSIS — M17.12 UNILATERAL PRIMARY OSTEOARTHRITIS, LEFT KNEE: ICD-10-CM

## 2024-06-21 DIAGNOSIS — E78.5 HYPERLIPIDEMIA, UNSPECIFIED: ICD-10-CM

## 2024-06-21 DIAGNOSIS — Z00.00 ENCOUNTER FOR GENERAL ADULT MEDICAL EXAMINATION W/OUT ABNORMAL FINDINGS: ICD-10-CM

## 2024-06-21 PROCEDURE — 73562 X-RAY EXAM OF KNEE 3: CPT | Mod: 50

## 2024-06-21 PROCEDURE — 99213 OFFICE O/P EST LOW 20 MIN: CPT

## 2024-06-21 PROCEDURE — 99396 PREV VISIT EST AGE 40-64: CPT

## 2024-06-21 RX ORDER — METHYLPREDNISOLONE 4 MG/1
4 TABLET ORAL
Qty: 1 | Refills: 0 | Status: DISCONTINUED | COMMUNITY
Start: 2024-02-05 | End: 2024-06-21

## 2024-06-21 RX ORDER — CELECOXIB 200 MG/1
200 CAPSULE ORAL TWICE DAILY
Refills: 0 | Status: DISCONTINUED | COMMUNITY
End: 2024-06-21

## 2024-06-21 RX ORDER — ATENOLOL 50 MG/1
50 TABLET ORAL DAILY
Refills: 0 | Status: DISCONTINUED | COMMUNITY
End: 2024-06-21

## 2024-06-21 RX ORDER — OXYCODONE 10 MG/1
10 TABLET ORAL
Refills: 0 | Status: DISCONTINUED | COMMUNITY
End: 2024-06-21

## 2024-06-21 RX ORDER — FAMOTIDINE 20 MG/1
20 TABLET, FILM COATED ORAL
Qty: 30 | Refills: 6 | Status: DISCONTINUED | COMMUNITY
Start: 2022-12-20 | End: 2024-06-21

## 2024-06-21 RX ORDER — OMEPRAZOLE 40 MG/1
40 CAPSULE, DELAYED RELEASE ORAL
Refills: 0 | Status: DISCONTINUED | COMMUNITY
End: 2024-06-21

## 2024-06-21 RX ORDER — PROPRANOLOL HYDROCHLORIDE 20 MG/1
20 TABLET ORAL DAILY
Refills: 0 | Status: DISCONTINUED | COMMUNITY
End: 2024-06-21

## 2024-06-21 NOTE — IMAGING
[de-identified] : RIGHT KNEE Incision well healed Calf soft, non-tender ROM 0-142 no effusion  Walks without assistance. anterior and medial pain  LUMBAR SPINE 5 out of 5 strength Sensation intact Equivocal straight leg raise Tenderness right lower back  LEFT KNEE Medial joint line tenderness No effusion Range of motion 0-1 40 Ligaments stable  XR RT KNEE (June 2024) showing no sig change since previous XR in Dec 2023- see CT scan findings XR LT KNEE (June 2024) showing mild OA, no sig change since XR in Dec 2023

## 2024-06-21 NOTE — ASSESSMENT
[FreeTextEntry1] : Previous doc: due to acute flare up of OA and loose body - with acute effusion -- will try aspiration and injection today hold on HA injectiondue to severe pain and swelling. 11/1/22: Medial sided OA. Pain is affecting his ADL's as well as playing with his kids, walking and stairs. He has failed conservative treatment and is indicated for UKA. Discussed this with his wife present. He is unsure of when he would like to proceed but will speak to Mirian regarding dates. Risks and benefits discussed and all questions answered. 2/7/23: Cont pain and effusion - planning for UKA in april, needs CT. Cannot aspirate due to timing of surgery. Recent cortisone inj 6 weeks ago. Will cont celebrex, ice, compression sleeve. 4/18/23: S/P right knee UKA on 4/5/23. First post op. Doing well. Continue PT/HEP, ice, elevation, meds as needed. 4/25/23: 3 weeks s/p R UKA - He does have an effusion today and sig tenderness over the past 3 days. Will asp and send fluid to labs to R/O infection. XR with components well fixed. min reddness but hypersensitivity 5/2/23: 4 weeks s/p R UKA - He continues to have an effusion and tenderness. Asp was negative, no obvious signs of infection. He will stop aspirin and start Tramadol and MDP. Will also restart PT and FU in 2 weeks. 5/16/23: IOmproving function but still complaining of medial pain, feels worse. Effusion resolving, XR well fixed, no movement of implant seen. Venous doppler r/o DVT, MRI eval tiba stress reaction. 6/6/23: Significant improvement. pain possibly from Tibial edema. Pt will be sent for PT and F/U in 4 weeks. This point I think he is functioning as I would expect 9 weeks post UKA I am very happy with his progress and I feel that we are out of the woods 7/11/23: Pt is gradually improving, still experiencing some pain- will continue with PT. Will send rx for bloodwork. Follow up in 4 weeks. I do not see any sig sign of infection but will f/u his prior blood work - aspiration was allblood - no sign of loosening on xray - still unsre of reason for sig pain but knee looks and function well - we will cont to closly monitor 8/25/23: Pt is very active doing upwards of 8000 steps a day but is having sig episodes of sig pain, all isolated anterior medial tibia pain where I did dissection of periosteum. sig improvement of effusion. Follow up in 1 month. Patient states that some days he walks 15,000 steps but afterwards is sore. This is expected. 12/5/23: 8 months s/p R UKA. XR look good. Will obtain MRI RT knee to eval synovitis or loosening -patient had tibial edema last MRI we will see if this is resolving -  may obtain CT depending on results. Will also MRI L-spine to eval HNP having some significant pain down the leg as well as some neurologic symptoms.  I am getting an MRI f his L-spine as patient is having persistent knee pain with activity and well as shooting pain down back of leg- look for HNP. Follow up after MRI.  1/2/24: 9 months s/p R UKA. Reviewed MRI L-spine/RT knee today- findings discussed. Some lumbar dx. it does not seem the lumbar disease is causing his knee pain.  He does have some mild pain at the joint line and what looks like some medial swelling no significant effusion good range of motion and function but still has some persistent medial pain with certain activities.  Has some mild patellofemoral OA but no anterior knee symptoms and I do not believe that this is a cause of his issues.  I see no mechanical reason for his pain- at this point I am recommending he seek a second opinion. Recc PT and core strengthening to help with the back pain. F/up after second opinion and PT  6/21/24: Over 1 year s/p R UKA. Reviewed CT scan today- findings discussed- though a rare complication, this would explain his symptoms. Discussed his options at this point are observance vs conversion to TKA. Discussed risks/benefits associated with preserving his partial knee replacement and why conversion to a total knee is the best surgical option. Will have him f/up every 3-6 months for repeat XR or sooner if pain worsens. Also has mild LT knee OA. Answered all pt questions- spoke to his wife on the phone as well.  He can use Tylenol over-the-counter meds if needed does not want anything stronger than this at this point

## 2024-06-21 NOTE — HISTORY OF PRESENT ILLNESS
[Sharp] : sharp [Shooting] : shooting [Tightness] : tightness [Frequent] : frequent [de-identified] : 6/21/24: Saw Dr. Pendleton in Feb- was sent for more imaging. No sig change in symptoms. Pain is not severe but is intermittent. Mostly stiffness, medial tenderness. Here to review CT scan RT knee.   Prev doc: Pt known to Dr Hathaway for rt knee OA - with MRI showing medial wear and effusion - no hx of inj in the past - pain was steady but recently did a lot of walking recently sig more pain and swelling - had aspiration last year that did help ---  11/1/22: Continues to have right knee pain as well as swelling. Also has shooting pains in the knee. No relief from HA injections, CSI and PT. Also having difficulty with commuting to work. Cannot play with his kids and pain inhibits his ADL. Worse with stairs and prolonged walking.  4/18/23: 2 weeks postop R UKA - He has pain radiating from the knee to the back as well as pain in the thigh. Denies fevers/chills. He is making progress with at home PT.  4/25/23: 3 weeks s/p R UKA - Over the past 3 days, he started to have a significant amount of pain and swelling in the knee and difficulty sleeping. Denies fevers, has chills.  5/2/32: Continued pain in R UKA says not any better- He has issues with sitting for prolonged periods of time. Asp was negative he felt better for a period - has troble staying asleep but is back to work   5/16/23: Feels worsening medial knee pain.  Took MDP which helped a little while taking meds.  Does not feel better than before surgery yet. 6/6/23: RT Knee MRI F/U. Pain is slightly better than last appointment, Pt is taken Tylenol or celebrex to help with pain. Pt has some stiffness. Overall improved from last visit. celebrex helping only one dose of oxy and no tramadol  7/11/23: 3 months s/p R UKA. Patient is still attending PT and is happy with progression and ROM but is still experiencing pain and worsens with walking. Patient has not been taking medication for pain recently due to liver enzyme concerns. 8/15/23: cont to have medial sided pain, feels that it is moving to some degree. but at times still has a lot of pain and stiffness 12/5/23: 8 months s/p R UKA. Reports pain comes and goes mostly on the medial side of the knee- no specific activity triggers it. States knee feels stiff but he is able to walk long distances - about 5 miles- but has sig pain afterwards. Not taking any meds for pain due to abnormal liver enzyme result a few months ago.  Also notes episodes of lower back buttocks pain that shoots down through his knee into his foot 1/2/24: Here to review MRI L-spine and MRI RT knee.  No significant change in symptoms [FreeTextEntry5] : SPARKLE is here today for right knee follow up. states pain and stiffness is increasing. shooting pain is at medial knee. had CT scan 05/31/24.

## 2024-06-21 NOTE — DATA REVIEWED
[MRI] : MRI [Lumbar Spine] : lumbar spine [CT Scan] : CT scan [Right] : of the right [Knee] : knee [I reviewed the films/CD] : I reviewed the films/CD [FreeTextEntry1] : MRI RT KNEE (May 2023) showing UKA in good position, mild joint effusion, lateral compartment intact, mild PF OA, and minimal stress reaction- significantly improved since previous MRI.  [FreeTextEntry2] : MRI L-SPINE (Dec 2023) showing disc bulging at L2-3 and L3-4, mild foraminal stenosis at these levels [FreeTextEntry3] : CT RT KNEE (May 2024) showing Area of sclerotic margin around cement bone interface

## 2024-06-21 NOTE — DISCUSSION/SUMMARY
[de-identified] : The patient was advised of the diagnosis.  The natural history of the pathology was explained in full to the patient in layman's terms. All questions were answered.  The risks and benefits of surgical and non-surgical treatment alternatives were explained in full to the patient.  Entered by Sho Burrows acting as a scribe.

## 2024-06-22 PROBLEM — H61.20 CERUMEN IMPACTION: Status: ACTIVE | Noted: 2024-06-21

## 2024-06-22 NOTE — HEALTH RISK ASSESSMENT
[Patient reported colonoscopy was normal] : Patient reported colonoscopy was normal [Hepatitis C test offered] : Hepatitis C test offered [Former] : Former [> 15 Years] : > 15 Years [ColonoscopyDate] : 7/2023 [ColonoscopyComments] : repeat in 5 yrs

## 2024-06-22 NOTE — PLAN
[FreeTextEntry1] :  48M, here for annual physical appt.  urine dribbling: pt counseled on kegel exercise to improve incontinence. right knee pain: has ortho follow up left elbow pain: pt to see ortho for evaluation and management.  chronic back pain: pt has a slouched posture, pt to try a chair wedge to promote proper back posture and resources given for spine conditioning exercise from [employer] website.  nasal congestion/cerumen impaction: pt referred to ENT, for now, pt to try flonase, neti pot to reduce allergy exposure. pt to consider HEPA filter in bedroom to reduce allergen   HLD on Crestor 5mg, check fasting lipid panel and LFT h/o BE: pt reminded to follow GI:  HCM: routine b/w today. CRCS/PSA UTD.

## 2024-06-22 NOTE — HISTORY OF PRESENT ILLNESS
[de-identified] : 48M, here for annual physical appt.  Pt had saw 2 urologists for discomfort while voiding, had normal PSA values and was recommended to start Flomax. he has started flomax for several weeks which helps his symptoms somewhat but he still having mild dribbling. He has not started Kegel exercises yet.  Pt has been seeing ortho for persistent right knee pain. HIs prior partial knee replacement is not working well. He is undergoing active monitoring and there is a consideration for a total knee replacement if pain does not improve.  Pt reports left arm pain for the past 6 months, has pain with flexion in the lateral aspect of the elbow. Pt does not play tennis but does play cricket with his children.  pt reports chronic back pain that is worst when he initially stands up after prolonged sitting. pt works at a desk job. Pt is not currently able to attend PT but is open to trying home exercises.  Pt also report nasal congestion daily and poor sleep quality due to waking up during the night sneezing. Pt has carpet in the bedroom. pt had rich food last night and will schedule his fasting b/w later in the week. Pt has not f/u with GI for his history of Antonio's

## 2024-06-22 NOTE — PHYSICAL EXAM
[No Acute Distress] : no acute distress [Well Nourished] : well nourished [Well Developed] : well developed [Well-Appearing] : well-appearing [Normal Sclera/Conjunctiva] : normal sclera/conjunctiva [PERRL] : pupils equal round and reactive to light [EOMI] : extraocular movements intact [Normal Outer Ear/Nose] : the outer ears and nose were normal in appearance [Normal Oropharynx] : the oropharynx was normal [No JVD] : no jugular venous distention [No Lymphadenopathy] : no lymphadenopathy [Supple] : supple [Thyroid Normal, No Nodules] : the thyroid was normal and there were no nodules present [No Respiratory Distress] : no respiratory distress  [No Accessory Muscle Use] : no accessory muscle use [Clear to Auscultation] : lungs were clear to auscultation bilaterally [Normal Rate] : normal rate  [Regular Rhythm] : with a regular rhythm [Normal S1, S2] : normal S1 and S2 [No Murmur] : no murmur heard [No Carotid Bruits] : no carotid bruits [No Abdominal Bruit] : a ~M bruit was not heard ~T in the abdomen [No Varicosities] : no varicosities [Pedal Pulses Present] : the pedal pulses are present [No Edema] : there was no peripheral edema [No Palpable Aorta] : no palpable aorta [No Extremity Clubbing/Cyanosis] : no extremity clubbing/cyanosis [Soft] : abdomen soft [Non Tender] : non-tender [Non-distended] : non-distended [No Masses] : no abdominal mass palpated [No HSM] : no HSM [Normal Bowel Sounds] : normal bowel sounds [Normal Posterior Cervical Nodes] : no posterior cervical lymphadenopathy [Normal Anterior Cervical Nodes] : no anterior cervical lymphadenopathy [No CVA Tenderness] : no CVA  tenderness [No Spinal Tenderness] : no spinal tenderness [No Joint Swelling] : no joint swelling [Grossly Normal Strength/Tone] : grossly normal strength/tone [No Rash] : no rash [Coordination Grossly Intact] : coordination grossly intact [No Focal Deficits] : no focal deficits [Normal Gait] : normal gait [Deep Tendon Reflexes (DTR)] : deep tendon reflexes were 2+ and symmetric [Normal Affect] : the affect was normal [Normal Insight/Judgement] : insight and judgment were intact [de-identified] : right ear cerumen impactiion, audible nasal congestion [de-identified] : full ROM b/l UE

## 2024-07-05 NOTE — H&P PST ADULT - NS SC CAGE ALCOHOL ANNOYED YOU
[facility]  Department of Orthopedic Surgery  Discharge Note  Short Stay    Admit Date: 7/5/2024    Discharge Date and Time: No discharge date for patient encounter.     Attending Physician: Jae Ramachandran II, MD     Discharge Provider: Jae Ramachandran II    Diagnoses:  Active Hospital Problems    Diagnosis  POA    Impingement syndrome of left shoulder region [M75.42]  Unknown      Resolved Hospital Problems   No resolved problems to display.       Discharged Condition: good    Hospital Course: Patient was admitted for an outpatient procedure and tolerated the procedure well with no complications.    Final Diagnoses: Same as principal problem.    Disposition: Home or Self Care    Follow up/Patient Instructions:    Medications:  Reconciled Home Medications:      Medication List        CONTINUE taking these medications      acetaminophen 325 MG tablet  Commonly known as: TYLENOL  Take 650 mg by mouth every 6 (six) hours as needed.     amLODIPine 2.5 MG tablet  Commonly known as: NORVASC  Take 1 tablet (2.5 mg total) by mouth once daily.     brimonidine 0.2% 0.2 % Drop  Commonly known as: ALPHAGAN  Place 1 drop into both eyes 2 (two) times daily.     ibuprofen 200 MG tablet  Commonly known as: ADVIL,MOTRIN     levothyroxine 100 MCG tablet  Commonly known as: SYNTHROID  Take 1 tablet (100 mcg total) by mouth before breakfast.     LUMIGAN 0.01 % Drop  Generic drug: bimatoprost  Place 1 drop into both eyes every evening.     rivastigmine 4.6 mg/24 hour Pt24  Commonly known as: EXELON  Place 1 patch onto the skin once daily.     solifenacin 5 MG tablet  Commonly known as: VESICARE  TAKE 1 TABLET DAILY     VITAMIN D-3 ORAL  Take 1 tablet by mouth once daily.            Discharge Procedure Orders   Diet Adult Regular     Change dressing (specify)   Order Comments: Dressing change: One time per day beginning 72 hours post op.     Ice to affected area     Notify your health care provider if you experience any of  the following:  increased confusion or weakness     Notify your health care provider if you experience any of the following:  persistent dizziness, light-headedness, or visual disturbances     Notify your health care provider if you experience any of the following:  worsening rash     Notify your health care provider if you experience any of the following:  severe persistent headache     Notify your health care provider if you experience any of the following:  difficulty breathing or increased cough     Notify your health care provider if you experience any of the following:  redness, tenderness, or signs of infection (pain, swelling, redness, odor or green/yellow discharge around incision site)     Notify your health care provider if you experience any of the following:  severe uncontrolled pain     Notify your health care provider if you experience any of the following:  persistent nausea and vomiting or diarrhea     Notify your health care provider if you experience any of the following:  temperature >100.4      Follow-up Information       Jae Ramachandran II, MD Follow up in 1 week(s).    Specialty: Orthopedic Surgery  Contact information:  [address] [place] [address] DR Leela SMALL [id number] [phone]                             Discharge Procedure Orders (must include Diet, Follow-up, Activity):   Discharge Procedure Orders (must include Diet, Follow-up, Activity)   Diet Adult Regular     Change dressing (specify)   Order Comments: Dressing change: One time per day beginning 72 hours post op.     Ice to affected area     Notify your health care provider if you experience any of the following:  increased confusion or weakness     Notify your health care provider if you experience any of the following:  persistent dizziness, light-headedness, or visual disturbances     Notify your health care provider if you experience any of the following:  worsening rash     Notify your health care provider if you experience any of  the following:  severe persistent headache     Notify your health care provider if you experience any of the following:  difficulty breathing or increased cough     Notify your health care provider if you experience any of the following:  redness, tenderness, or signs of infection (pain, swelling, redness, odor or green/yellow discharge around incision site)     Notify your health care provider if you experience any of the following:  severe uncontrolled pain     Notify your health care provider if you experience any of the following:  persistent nausea and vomiting or diarrhea     Notify your health care provider if you experience any of the following:  temperature >100.4       no

## 2024-07-31 ENCOUNTER — APPOINTMENT (OUTPATIENT)
Dept: ORTHOPEDIC SURGERY | Facility: CLINIC | Age: 49
End: 2024-07-31

## 2024-08-19 RX ORDER — OMEPRAZOLE 20 MG/1
20 CAPSULE, DELAYED RELEASE ORAL
Qty: 90 | Refills: 1 | Status: ACTIVE | COMMUNITY
Start: 2024-08-19 | End: 1900-01-01

## 2024-08-20 ENCOUNTER — APPOINTMENT (OUTPATIENT)
Dept: ORTHOPEDIC SURGERY | Facility: CLINIC | Age: 49
End: 2024-08-20
Payer: COMMERCIAL

## 2024-08-20 VITALS — HEIGHT: 64 IN | BODY MASS INDEX: 24.07 KG/M2 | WEIGHT: 141 LBS

## 2024-08-20 DIAGNOSIS — Z96.651 PRESENCE OF RIGHT ARTIFICIAL KNEE JOINT: ICD-10-CM

## 2024-08-20 PROCEDURE — 73562 X-RAY EXAM OF KNEE 3: CPT | Mod: RT

## 2024-08-20 PROCEDURE — 99214 OFFICE O/P EST MOD 30 MIN: CPT

## 2024-08-20 RX ORDER — TRAMADOL HYDROCHLORIDE 50 MG/1
50 TABLET, COATED ORAL EVERY 8 HOURS
Qty: 30 | Refills: 0 | Status: ACTIVE | COMMUNITY
Start: 2024-08-20 | End: 1900-01-01

## 2024-08-20 RX ORDER — METHYLPREDNISOLONE 4 MG/1
4 TABLET ORAL
Qty: 1 | Refills: 0 | Status: ACTIVE | COMMUNITY
Start: 2024-08-20 | End: 1900-01-01

## 2024-08-20 NOTE — IMAGING
[de-identified] : RIGHT KNEE Incision well healed Calf soft, non-tender ROM 0-142 no effusion  Walks without assistance. anterior and medial pain  LUMBAR SPINE 5 out of 5 strength Sensation intact Equivocal straight leg raise Tenderness right lower back  LEFT KNEE Medial joint line tenderness No effusion Range of motion 0-1 40 Ligaments stable  XR RT KNEE (8/20/24) showing no sig change since previous XR in June 2024- see CT scan findings XR LT KNEE (6/21/24) showing mild OA, no sig change since XR in Dec 2023

## 2024-08-20 NOTE — ASSESSMENT
[FreeTextEntry1] : Previous doc: due to acute flare up of OA and loose body - with acute effusion -- will try aspiration and injection today hold on HA injectiondue to severe pain and swelling. 11/1/22: Medial sided OA. Pain is affecting his ADL's as well as playing with his kids, walking and stairs. He has failed conservative treatment and is indicated for UKA. Discussed this with his wife present. He is unsure of when he would like to proceed but will speak to Mirian regarding dates. Risks and benefits discussed and all questions answered. 2/7/23: Cont pain and effusion - planning for UKA in april, needs CT. Cannot aspirate due to timing of surgery. Recent cortisone inj 6 weeks ago. Will cont celebrex, ice, compression sleeve. 4/18/23: S/P right knee UKA on 4/5/23. First post op. Doing well. Continue PT/HEP, ice, elevation, meds as needed. 4/25/23: 3 weeks s/p R UKA - He does have an effusion today and sig tenderness over the past 3 days. Will asp and send fluid to labs to R/O infection. XR with components well fixed. min reddness but hypersensitivity 5/2/23: 4 weeks s/p R UKA - He continues to have an effusion and tenderness. Asp was negative, no obvious signs of infection. He will stop aspirin and start Tramadol and MDP. Will also restart PT and FU in 2 weeks. 5/16/23: IOmproving function but still complaining of medial pain, feels worse. Effusion resolving, XR well fixed, no movement of implant seen. Venous doppler r/o DVT, MRI eval tiba stress reaction. 6/6/23: Significant improvement. pain possibly from Tibial edema. Pt will be sent for PT and F/U in 4 weeks. This point I think he is functioning as I would expect 9 weeks post UKA I am very happy with his progress and I feel that we are out of the woods 7/11/23: Pt is gradually improving, still experiencing some pain- will continue with PT. Will send rx for bloodwork. Follow up in 4 weeks. I do not see any sig sign of infection but will f/u his prior blood work - aspiration was allblood - no sign of loosening on xray - still unsre of reason for sig pain but knee looks and function well - we will cont to closly monitor 8/25/23: Pt is very active doing upwards of 8000 steps a day but is having sig episodes of sig pain, all isolated anterior medial tibia pain where I did dissection of periosteum. sig improvement of effusion. Follow up in 1 month. Patient states that some days he walks 15,000 steps but afterwards is sore. This is expected. 12/5/23: 8 months s/p R UKA. XR look good. Will obtain MRI RT knee to eval synovitis or loosening -patient had tibial edema last MRI we will see if this is resolving -  may obtain CT depending on results. Will also MRI L-spine to eval HNP having some significant pain down the leg as well as some neurologic symptoms.  I am getting an MRI f his L-spine as patient is having persistent knee pain with activity and well as shooting pain down back of leg- look for HNP. Follow up after MRI.  1/2/24: 9 months s/p R UKA. Reviewed MRI L-spine/RT knee today- findings discussed. Some lumbar dx. it does not seem the lumbar disease is causing his knee pain.  He does have some mild pain at the joint line and what looks like some medial swelling no significant effusion good range of motion and function but still has some persistent medial pain with certain activities.  Has some mild patellofemoral OA but no anterior knee symptoms and I do not believe that this is a cause of his issues.  I see no mechanical reason for his pain- at this point I am recommending he seek a second opinion. Recc PT and core strengthening to help with the back pain. F/up after second opinion and PT 6/21/24: Over 1 year s/p R UKA. Reviewed CT scan today- findings discussed- though a rare complication, this would explain his symptoms. Discussed his options at this point are observance vs conversion to TKA. Discussed risks/benefits associated with preserving his partial knee replacement and why conversion to a total knee is the best surgical option. Will have him f/up every 3-6 months for repeat XR or sooner if pain worsens. Also has mild LT knee OA. Answered all pt questions- spoke to his wife on the phone as well.  He can use Tylenol over-the-counter meds if needed does not want anything stronger than this at this point  8/20/24: Over 1 year s/p R UKA. Having worsening pain but from an XR standpoint- no sig change since previous XR. Again discussed the tibial component may be loosening but reassured pt he can go on his vacation and surgical intervention is not urgent. Given MDP Rx for acute relief. Tramadol Rx. F/up 3 months or sooner if pain worsens

## 2024-08-20 NOTE — DISCUSSION/SUMMARY
[de-identified] : The patient was advised of the diagnosis.  The natural history of the pathology was explained in full to the patient in layman's terms. All questions were answered.  The risks and benefits of surgical and non-surgical treatment alternatives were explained in full to the patient.  Entered by Sho Burrows acting as a scribe.

## 2024-08-20 NOTE — IMAGING
[de-identified] : RIGHT KNEE Incision well healed Calf soft, non-tender ROM 0-142 no effusion  Walks without assistance. anterior and medial pain  LUMBAR SPINE 5 out of 5 strength Sensation intact Equivocal straight leg raise Tenderness right lower back  LEFT KNEE Medial joint line tenderness No effusion Range of motion 0-1 40 Ligaments stable  XR RT KNEE (8/20/24) showing no sig change since previous XR in June 2024- see CT scan findings XR LT KNEE (6/21/24) showing mild OA, no sig change since XR in Dec 2023

## 2024-08-20 NOTE — HISTORY OF PRESENT ILLNESS
[de-identified] : 8/20/24: Over 1 year s/p R UKA. Pt reports RT knee pain worsening in the past few weeks. No injury. Pt states he is going to the [place] next week and is concerned about pain.   Prev doc: Pt known to Dr Hathaway for rt knee OA - with MRI showing medial wear and effusion - no hx of inj in the past - pain was steady but recently did a lot of walking recently sig more pain and swelling - had aspiration last year that did help ---  11/1/22: Continues to have right knee pain as well as swelling. Also has shooting pains in the knee. No relief from HA injections, CSI and PT. Also having difficulty with commuting to work. Cannot play with his kids and pain inhibits his ADL. Worse with stairs and prolonged walking.  4/18/23: 2 weeks postop R UKA - He has pain radiating from the knee to the back as well as pain in the thigh. Denies fevers/chills. He is making progress with at home PT.  4/25/23: 3 weeks s/p R UKA - Over the past 3 days, he started to have a significant amount of pain and swelling in the knee and difficulty sleeping. Denies fevers, has chills.  5/2/32: Continued pain in R UKA says not any better- He has issues with sitting for prolonged periods of time. Asp was negative he felt better for a period - has troble staying asleep but is back to work   5/16/23: Feels worsening medial knee pain.  Took MDP which helped a little while taking meds.  Does not feel better than before surgery yet. 6/6/23: RT Knee MRI F/U. Pain is slightly better than last appointment, Pt is taken Tylenol or celebrex to help with pain. Pt has some stiffness. Overall improved from last visit. celebrex helping only one dose of oxy and no tramadol  7/11/23: 3 months s/p R UKA. Patient is still attending PT and is happy with progression and ROM but is still experiencing pain and worsens with walking. Patient has not been taking medication for pain recently due to liver enzyme concerns. 8/15/23: cont to have medial sided pain, feels that it is moving to some degree. but at times still has a lot of pain and stiffness 12/5/23: 8 months s/p R UKA. Reports pain comes and goes mostly on the medial side of the knee- no specific activity triggers it. States knee feels stiff but he is able to walk long distances - about 5 miles- but has sig pain afterwards. Not taking any meds for pain due to abnormal liver enzyme result a few months ago.  Also notes episodes of lower back buttocks pain that shoots down through his knee into his foot 1/2/24: Here to review MRI L-spine and MRI RT knee.  No significant change in symptoms 6/21/24: Saw Dr. Pendleton in Feb- was sent for more imaging. No sig change in symptoms. Pain is not severe but is intermittent. Mostly stiffness, medial tenderness. Here to review CT scan RT knee.  [FreeTextEntry5] : pain increasing.

## 2024-08-20 NOTE — DISCUSSION/SUMMARY
[de-identified] : The patient was advised of the diagnosis.  The natural history of the pathology was explained in full to the patient in layman's terms. All questions were answered.  The risks and benefits of surgical and non-surgical treatment alternatives were explained in full to the patient.  Entered by Sho Burrows acting as a scribe.

## 2024-08-20 NOTE — HISTORY OF PRESENT ILLNESS
[de-identified] : 8/20/24: Over 1 year s/p R UKA. Pt reports RT knee pain worsening in the past few weeks. No injury. Pt states he is going to the [place] next week and is concerned about pain.   Prev doc: Pt known to Dr Hathaway for rt knee OA - with MRI showing medial wear and effusion - no hx of inj in the past - pain was steady but recently did a lot of walking recently sig more pain and swelling - had aspiration last year that did help ---  11/1/22: Continues to have right knee pain as well as swelling. Also has shooting pains in the knee. No relief from HA injections, CSI and PT. Also having difficulty with commuting to work. Cannot play with his kids and pain inhibits his ADL. Worse with stairs and prolonged walking.  4/18/23: 2 weeks postop R UKA - He has pain radiating from the knee to the back as well as pain in the thigh. Denies fevers/chills. He is making progress with at home PT.  4/25/23: 3 weeks s/p R UKA - Over the past 3 days, he started to have a significant amount of pain and swelling in the knee and difficulty sleeping. Denies fevers, has chills.  5/2/32: Continued pain in R UKA says not any better- He has issues with sitting for prolonged periods of time. Asp was negative he felt better for a period - has troble staying asleep but is back to work   5/16/23: Feels worsening medial knee pain.  Took MDP which helped a little while taking meds.  Does not feel better than before surgery yet. 6/6/23: RT Knee MRI F/U. Pain is slightly better than last appointment, Pt is taken Tylenol or celebrex to help with pain. Pt has some stiffness. Overall improved from last visit. celebrex helping only one dose of oxy and no tramadol  7/11/23: 3 months s/p R UKA. Patient is still attending PT and is happy with progression and ROM but is still experiencing pain and worsens with walking. Patient has not been taking medication for pain recently due to liver enzyme concerns. 8/15/23: cont to have medial sided pain, feels that it is moving to some degree. but at times still has a lot of pain and stiffness 12/5/23: 8 months s/p R UKA. Reports pain comes and goes mostly on the medial side of the knee- no specific activity triggers it. States knee feels stiff but he is able to walk long distances - about 5 miles- but has sig pain afterwards. Not taking any meds for pain due to abnormal liver enzyme result a few months ago.  Also notes episodes of lower back buttocks pain that shoots down through his knee into his foot 1/2/24: Here to review MRI L-spine and MRI RT knee.  No significant change in symptoms 6/21/24: Saw Dr. Pendleton in Feb- was sent for more imaging. No sig change in symptoms. Pain is not severe but is intermittent. Mostly stiffness, medial tenderness. Here to review CT scan RT knee.  [FreeTextEntry5] : pain increasing.

## 2024-08-23 ENCOUNTER — APPOINTMENT (OUTPATIENT)
Dept: INTERNAL MEDICINE | Facility: CLINIC | Age: 49
End: 2024-08-23

## 2024-09-17 ENCOUNTER — APPOINTMENT (OUTPATIENT)
Dept: GASTROENTEROLOGY | Facility: CLINIC | Age: 49
End: 2024-09-17
Payer: COMMERCIAL

## 2024-09-17 VITALS
SYSTOLIC BLOOD PRESSURE: 124 MMHG | BODY MASS INDEX: 24.75 KG/M2 | DIASTOLIC BLOOD PRESSURE: 80 MMHG | HEIGHT: 64 IN | HEART RATE: 66 BPM | WEIGHT: 145 LBS | OXYGEN SATURATION: 99 %

## 2024-09-17 PROCEDURE — 99215 OFFICE O/P EST HI 40 MIN: CPT

## 2024-09-17 RX ORDER — FAMOTIDINE 20 MG/1
20 TABLET, FILM COATED ORAL TWICE DAILY
Qty: 60 | Refills: 6 | Status: ACTIVE | COMMUNITY
Start: 2024-09-17 | End: 1900-01-01

## 2024-09-17 NOTE — ASSESSMENT
[FreeTextEntry1] : 49-year-old male with a hx of GERD. 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 Pt was told in the past that he had Antonio's esophagus. Had a colonoscopy in 2023 which was negative. Was on Omeprazole for GERD which worked well. However, he stopped the Omeprazole for knee surgery and the GERD did not return until recently. Recently he has been having about 2 bouts of GERD per week. He is worried about long term PPI because of the bone effects.   Plan: Pt was told that he had Antonio's esophagus in the past, but I reviewed the past EGD report by an outside GI, and there is no mention of Antonio's esophagus. and there is no mention of Antonio's esophagus in my EGD report or on the path. I plan to start Pepcid 20mg BID and pt can use Omeprazole as a rescue medication. I will call pathology and make sure that there is no misdiagnosis regarding Antonio's esopahgus.   I spent 40 min in the exam room with the pt.

## 2024-09-17 NOTE — HISTORY OF PRESENT ILLNESS
[FreeTextEntry1] : 49-year-old male with a hx of GERD. 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 Pt was told in the past that he had Antonio's esophagus. Had a colonoscopy in 2023 which was negative. Was on Omeprazole for GERD which worked well. However, he stopped the Omeprazole for knee surgery and the GERD did not return until recently. Recently he has been having about 2 bouts of GERD per week. He is worried about long term PPI because of the bone effects.

## 2024-09-17 NOTE — PHYSICAL EXAM

## 2024-11-06 ENCOUNTER — APPOINTMENT (OUTPATIENT)
Dept: ORTHOPEDIC SURGERY | Facility: CLINIC | Age: 49
End: 2024-11-06
Payer: COMMERCIAL

## 2024-11-06 VITALS
HEART RATE: 70 BPM | DIASTOLIC BLOOD PRESSURE: 92 MMHG | TEMPERATURE: 97 F | HEIGHT: 64 IN | SYSTOLIC BLOOD PRESSURE: 146 MMHG | WEIGHT: 145 LBS | BODY MASS INDEX: 24.75 KG/M2

## 2024-11-06 DIAGNOSIS — M17.12 UNILATERAL PRIMARY OSTEOARTHRITIS, LEFT KNEE: ICD-10-CM

## 2024-11-06 DIAGNOSIS — Z96.651 PRESENCE OF RIGHT ARTIFICIAL KNEE JOINT: ICD-10-CM

## 2024-11-06 DIAGNOSIS — M54.41 LUMBAGO WITH SCIATICA, RIGHT SIDE: ICD-10-CM

## 2024-11-06 DIAGNOSIS — G89.29 LUMBAGO WITH SCIATICA, RIGHT SIDE: ICD-10-CM

## 2024-11-06 DIAGNOSIS — T84.032S MECHANICAL LOOSENING OF INTERNAL RIGHT KNEE PROSTHETIC JOINT, SEQUELA: ICD-10-CM

## 2024-11-06 PROCEDURE — 73562 X-RAY EXAM OF KNEE 3: CPT | Mod: RT

## 2024-11-06 PROCEDURE — G2211 COMPLEX E/M VISIT ADD ON: CPT

## 2024-11-06 PROCEDURE — 99214 OFFICE O/P EST MOD 30 MIN: CPT

## 2024-11-19 ENCOUNTER — APPOINTMENT (OUTPATIENT)
Dept: GASTROENTEROLOGY | Facility: CLINIC | Age: 49
End: 2024-11-19
Payer: COMMERCIAL

## 2024-11-19 VITALS
BODY MASS INDEX: 24.75 KG/M2 | WEIGHT: 145 LBS | HEART RATE: 72 BPM | OXYGEN SATURATION: 99 % | SYSTOLIC BLOOD PRESSURE: 132 MMHG | HEIGHT: 64 IN | DIASTOLIC BLOOD PRESSURE: 82 MMHG

## 2024-11-19 PROCEDURE — 99213 OFFICE O/P EST LOW 20 MIN: CPT

## 2024-11-20 ENCOUNTER — APPOINTMENT (OUTPATIENT)
Dept: INTERNAL MEDICINE | Facility: CLINIC | Age: 49
End: 2024-11-20
Payer: COMMERCIAL

## 2024-11-20 VITALS
WEIGHT: 145 LBS | BODY MASS INDEX: 24.75 KG/M2 | HEART RATE: 62 BPM | TEMPERATURE: 98 F | SYSTOLIC BLOOD PRESSURE: 138 MMHG | OXYGEN SATURATION: 98 % | HEIGHT: 64 IN | DIASTOLIC BLOOD PRESSURE: 84 MMHG

## 2024-11-20 DIAGNOSIS — R41.3 OTHER AMNESIA: ICD-10-CM

## 2024-11-20 DIAGNOSIS — R14.0 ABDOMINAL DISTENSION (GASEOUS): ICD-10-CM

## 2024-11-20 DIAGNOSIS — R94.5 ABNORMAL RESULTS OF LIVER FUNCTION STUDIES: ICD-10-CM

## 2024-11-20 DIAGNOSIS — E78.5 HYPERLIPIDEMIA, UNSPECIFIED: ICD-10-CM

## 2024-11-20 DIAGNOSIS — R10.9 UNSPECIFIED ABDOMINAL PAIN: ICD-10-CM

## 2024-11-20 DIAGNOSIS — R73.01 IMPAIRED FASTING GLUCOSE: ICD-10-CM

## 2024-11-20 PROCEDURE — G2211 COMPLEX E/M VISIT ADD ON: CPT

## 2024-11-20 PROCEDURE — 99213 OFFICE O/P EST LOW 20 MIN: CPT

## 2024-12-13 ENCOUNTER — RX RENEWAL (OUTPATIENT)
Age: 49
End: 2024-12-13

## 2025-01-10 ENCOUNTER — APPOINTMENT (OUTPATIENT)
Dept: INTERNAL MEDICINE | Facility: CLINIC | Age: 50
End: 2025-01-10

## 2025-01-17 ENCOUNTER — APPOINTMENT (OUTPATIENT)
Dept: INFECTIOUS DISEASE | Facility: CLINIC | Age: 50
End: 2025-01-17

## 2025-01-17 DIAGNOSIS — Z71.84 ENC FOR HEALTH COUNSELING RELATED TO TRAVEL: ICD-10-CM

## 2025-01-17 PROCEDURE — 90690 TYPHOID VACCINE ORAL: CPT

## 2025-01-17 PROCEDURE — 99401 PREV MED CNSL INDIV APPRX 15: CPT

## 2025-01-17 PROCEDURE — 90473 IMMUNE ADMIN ORAL/NASAL: CPT | Mod: NC

## 2025-01-17 RX ORDER — AZITHROMYCIN 250 MG/1
250 TABLET, FILM COATED ORAL
Qty: 8 | Refills: 0 | Status: ACTIVE | COMMUNITY
Start: 2025-01-17 | End: 1900-01-01

## 2025-01-17 RX ORDER — ATOVAQUONE AND PROGUANIL HYDROCHLORIDE 250; 100 MG/1; MG/1
250-100 TABLET, FILM COATED ORAL DAILY
Qty: 25 | Refills: 0 | Status: ACTIVE | COMMUNITY
Start: 2025-01-17 | End: 1900-01-01

## 2025-01-24 ENCOUNTER — APPOINTMENT (OUTPATIENT)
Dept: INTERNAL MEDICINE | Facility: CLINIC | Age: 50
End: 2025-01-24
Payer: COMMERCIAL

## 2025-01-24 VITALS
HEIGHT: 64 IN | WEIGHT: 144 LBS | DIASTOLIC BLOOD PRESSURE: 71 MMHG | OXYGEN SATURATION: 98 % | HEART RATE: 84 BPM | BODY MASS INDEX: 24.59 KG/M2 | SYSTOLIC BLOOD PRESSURE: 115 MMHG

## 2025-01-24 DIAGNOSIS — R39.15 URGENCY OF URINATION: ICD-10-CM

## 2025-01-24 DIAGNOSIS — R73.03 PREDIABETES.: ICD-10-CM

## 2025-01-24 DIAGNOSIS — E78.5 HYPERLIPIDEMIA, UNSPECIFIED: ICD-10-CM

## 2025-01-24 DIAGNOSIS — K21.9 GASTRO-ESOPHAGEAL REFLUX DISEASE W/OUT ESOPHAGITIS: ICD-10-CM

## 2025-01-24 PROCEDURE — 99214 OFFICE O/P EST MOD 30 MIN: CPT

## 2025-01-24 PROCEDURE — G2211 COMPLEX E/M VISIT ADD ON: CPT

## 2025-03-24 ENCOUNTER — APPOINTMENT (OUTPATIENT)
Dept: INTERNAL MEDICINE | Facility: CLINIC | Age: 50
End: 2025-03-24

## 2025-05-01 NOTE — ASU PREOP CHECKLIST - PATIENT SENT TO
EEG update:  Diffuse background slowing. Continuous right hemisphere slowing. No seizures. No epileptiform discharges.   endoscopy/Procedure room

## 2025-05-09 ENCOUNTER — APPOINTMENT (OUTPATIENT)
Dept: INTERNAL MEDICINE | Facility: CLINIC | Age: 50
End: 2025-05-09

## 2025-05-27 ENCOUNTER — APPOINTMENT (OUTPATIENT)
Dept: ORTHOPEDIC SURGERY | Facility: CLINIC | Age: 50
End: 2025-05-27
Payer: COMMERCIAL

## 2025-05-27 DIAGNOSIS — Z96.651 PRESENCE OF RIGHT ARTIFICIAL KNEE JOINT: ICD-10-CM

## 2025-05-27 DIAGNOSIS — M47.816 SPONDYLOSIS W/OUT MYELOPATHY OR RADICULOPATHY, LUMBAR REGION: ICD-10-CM

## 2025-05-27 PROCEDURE — 73562 X-RAY EXAM OF KNEE 3: CPT | Mod: 50

## 2025-05-27 PROCEDURE — 72100 X-RAY EXAM L-S SPINE 2/3 VWS: CPT

## 2025-05-27 PROCEDURE — 99214 OFFICE O/P EST MOD 30 MIN: CPT

## 2025-05-27 RX ORDER — CYCLOBENZAPRINE HYDROCHLORIDE 5 MG/1
5 TABLET, FILM COATED ORAL 3 TIMES DAILY
Qty: 60 | Refills: 0 | Status: ACTIVE | COMMUNITY
Start: 2025-05-27 | End: 1900-01-01

## 2025-07-09 ENCOUNTER — APPOINTMENT (OUTPATIENT)
Dept: INTERNAL MEDICINE | Facility: CLINIC | Age: 50
End: 2025-07-09
Payer: COMMERCIAL

## 2025-07-09 ENCOUNTER — RESULT REVIEW (OUTPATIENT)
Age: 50
End: 2025-07-09

## 2025-07-09 VITALS
WEIGHT: 141 LBS | OXYGEN SATURATION: 98 % | BODY MASS INDEX: 24.07 KG/M2 | SYSTOLIC BLOOD PRESSURE: 106 MMHG | HEIGHT: 64 IN | HEART RATE: 74 BPM | DIASTOLIC BLOOD PRESSURE: 70 MMHG | TEMPERATURE: 98 F

## 2025-07-09 PROCEDURE — 99213 OFFICE O/P EST LOW 20 MIN: CPT

## 2025-07-09 PROCEDURE — G2211 COMPLEX E/M VISIT ADD ON: CPT

## 2025-07-10 LAB
ALBUMIN SERPL ELPH-MCNC: 4.5 G/DL
ALP BLD-CCNC: 61 U/L
ALT SERPL-CCNC: 89 U/L
ANION GAP SERPL CALC-SCNC: 10 MMOL/L
AST SERPL-CCNC: 47 U/L
BASOPHILS # BLD AUTO: 0.04 K/UL
BASOPHILS NFR BLD AUTO: 0.5 %
BILIRUB SERPL-MCNC: 0.5 MG/DL
BUN SERPL-MCNC: 12 MG/DL
CALCIUM SERPL-MCNC: 9.4 MG/DL
CHLORIDE SERPL-SCNC: 101 MMOL/L
CO2 SERPL-SCNC: 25 MMOL/L
CREAT SERPL-MCNC: 0.78 MG/DL
EGFRCR SERPLBLD CKD-EPI 2021: 108 ML/MIN/1.73M2
EOSINOPHIL # BLD AUTO: 0.2 K/UL
EOSINOPHIL NFR BLD AUTO: 2.3 %
ERYTHROCYTE [SEDIMENTATION RATE] IN BLOOD BY WESTERGREN METHOD: 5 MM/HR
GLUCOSE SERPL-MCNC: 86 MG/DL
HCT VFR BLD CALC: 43.7 %
HGB BLD-MCNC: 14.2 G/DL
IMM GRANULOCYTES NFR BLD AUTO: 0.4 %
LYMPHOCYTES # BLD AUTO: 2.29 K/UL
LYMPHOCYTES NFR BLD AUTO: 26.7 %
MAN DIFF?: NORMAL
MCHC RBC-ENTMCNC: 28.4 PG
MCHC RBC-ENTMCNC: 32.5 G/DL
MCV RBC AUTO: 87.4 FL
MONOCYTES # BLD AUTO: 0.73 K/UL
MONOCYTES NFR BLD AUTO: 8.5 %
NEUTROPHILS # BLD AUTO: 5.28 K/UL
NEUTROPHILS NFR BLD AUTO: 61.6 %
PLATELET # BLD AUTO: 276 K/UL
POTASSIUM SERPL-SCNC: 4.3 MMOL/L
PROT SERPL-MCNC: 6.8 G/DL
RBC # BLD: 5 M/UL
RBC # FLD: 13.2 %
SODIUM SERPL-SCNC: 137 MMOL/L
WBC # FLD AUTO: 8.57 K/UL

## 2025-07-12 ENCOUNTER — APPOINTMENT (OUTPATIENT)
Dept: CT IMAGING | Facility: CLINIC | Age: 50
End: 2025-07-12
Payer: COMMERCIAL

## 2025-07-12 ENCOUNTER — OUTPATIENT (OUTPATIENT)
Dept: OUTPATIENT SERVICES | Facility: HOSPITAL | Age: 50
LOS: 1 days | End: 2025-07-12
Payer: COMMERCIAL

## 2025-07-12 DIAGNOSIS — E78.5 HYPERLIPIDEMIA, UNSPECIFIED: ICD-10-CM

## 2025-07-12 PROCEDURE — 74176 CT ABD & PELVIS W/O CONTRAST: CPT

## 2025-07-12 PROCEDURE — 74176 CT ABD & PELVIS W/O CONTRAST: CPT | Mod: 26

## 2025-07-15 ENCOUNTER — APPOINTMENT (OUTPATIENT)
Dept: INTERNAL MEDICINE | Facility: CLINIC | Age: 50
End: 2025-07-15
Payer: COMMERCIAL

## 2025-07-15 VITALS
BODY MASS INDEX: 23.9 KG/M2 | WEIGHT: 140 LBS | DIASTOLIC BLOOD PRESSURE: 73 MMHG | HEART RATE: 65 BPM | HEIGHT: 64 IN | OXYGEN SATURATION: 98 % | SYSTOLIC BLOOD PRESSURE: 112 MMHG

## 2025-07-15 PROCEDURE — 99214 OFFICE O/P EST MOD 30 MIN: CPT

## 2025-07-15 PROCEDURE — G2211 COMPLEX E/M VISIT ADD ON: CPT

## 2025-07-16 ENCOUNTER — RX RENEWAL (OUTPATIENT)
Age: 50
End: 2025-07-16

## 2025-07-20 LAB
CYCLOSPORA: DETECTED
GI PCR PANEL: DETECTED

## 2025-07-29 ENCOUNTER — APPOINTMENT (OUTPATIENT)
Dept: GASTROENTEROLOGY | Facility: CLINIC | Age: 50
End: 2025-07-29

## 2025-08-08 ENCOUNTER — APPOINTMENT (OUTPATIENT)
Dept: INTERNAL MEDICINE | Facility: CLINIC | Age: 50
End: 2025-08-08

## 2025-09-08 ENCOUNTER — APPOINTMENT (OUTPATIENT)
Dept: ORTHOPEDIC SURGERY | Facility: CLINIC | Age: 50
End: 2025-09-08

## (undated) DEVICE — CONTAINER FORMALIN 80ML YELLOW

## (undated) DEVICE — NDL HYPO SAFE 22G X 1.5" (BLACK)

## (undated) DEVICE — BIOPSY FORCEP COLD DISP

## (undated) DEVICE — BASIN EMESIS 10IN GRADUATED MAUVE

## (undated) DEVICE — DRSG DERMABOND PRINEO 22CM

## (undated) DEVICE — MAKO VIZADISC KNEE TRACKING KIT

## (undated) DEVICE — CATH IV SAFE BC 22G X 1" (BLUE)

## (undated) DEVICE — PREP SCRUB BRUSH W CHG 4%

## (undated) DEVICE — SYR LUER LOK 20CC

## (undated) DEVICE — WARMING BLANKET UPPER ADULT

## (undated) DEVICE — FRA-ESU BOVIE FORCE TRIAD T7J19731DX: Type: DURABLE MEDICAL EQUIPMENT

## (undated) DEVICE — TUBING MEDI-VAC W MAXIGRIP CONNECTORS 1/4"X6'

## (undated) DEVICE — LUBRICATING JELLY HR ONE SHOT 3G

## (undated) DEVICE — PACK IV START WITH CHG

## (undated) DEVICE — MIXER BONE CEMENT EVAC III

## (undated) DEVICE — DRSG ACE BANDAGE 6"

## (undated) DEVICE — DRSG BANDAID 0.75X3"

## (undated) DEVICE — DRSG TELFA 3 X 8

## (undated) DEVICE — DRAPE STERI-DRAPE INCISE 19X17"

## (undated) DEVICE — SUCTION TIP KAMVAC MINI

## (undated) DEVICE — MAKO BALL BUR

## (undated) DEVICE — PACK TOTAL KNEE

## (undated) DEVICE — DRSG CURITY GAUZE SPONGE 4 X 4" 12-PLY NON-STERILE

## (undated) DEVICE — DRAPE SURGICAL #1010

## (undated) DEVICE — MAKO DRAPE KIT

## (undated) DEVICE — MAKO BLADE STANDARD

## (undated) DEVICE — ZIMMER PULSAVAC PLUS FAN KIT

## (undated) DEVICE — TUBING IV SET GRAVITY 3Y 100" MACRO

## (undated) DEVICE — LINE BREATHE SAMPLNG

## (undated) DEVICE — SALIVA EJECTOR (BLUE)

## (undated) DEVICE — CRYO/CUFF GRAVITY COOLER KNEE LARGE

## (undated) DEVICE — BIOPSY FORCEP RADIAL JAW 4 STANDARD WITH NEEDLE

## (undated) DEVICE — SUT VICRYL 0 27" CT-1 UNDYED

## (undated) DEVICE — SUT STRATAFIX SPIRAL MONOCRYL PLUS 4-0 45CM PS-2 UNDYED

## (undated) DEVICE — SUT STRATAFIX SYMMETRIC PDS PLUS 1 18" CTX VIOLET

## (undated) DEVICE — VENODYNE/SCD SLEEVE CALF MEDIUM

## (undated) DEVICE — SUT STRATAFIX SPIRAL PDS PLUS 2-0 45CM CT-1

## (undated) DEVICE — MEDICATION LABELS W MARKER

## (undated) DEVICE — GLV 8.5 PROTEXIS (WHITE)

## (undated) DEVICE — DRSG 2X2

## (undated) DEVICE — SOL IRR BAG NS 0.9% 3000ML

## (undated) DEVICE — SAW BLADE STRYKER SAGITTAL 25X0.89X75MM

## (undated) DEVICE — HOOD T5 PEELAWAY

## (undated) DEVICE — TOURNIQUET ESMARK 6"

## (undated) DEVICE — GOWN LG

## (undated) DEVICE — TUBING SUCTION NONCONDUCTIVE 6MM X 12FT

## (undated) DEVICE — ELCTR ECG CONDUCTIVE ADHESIVE

## (undated) DEVICE — ELCTR GROUNDING PAD ADULT COVIDIEN

## (undated) DEVICE — MAKO CHECKPOINT KIT FEMORAL / TIBIAL